# Patient Record
Sex: FEMALE | Race: WHITE | NOT HISPANIC OR LATINO | URBAN - METROPOLITAN AREA
[De-identification: names, ages, dates, MRNs, and addresses within clinical notes are randomized per-mention and may not be internally consistent; named-entity substitution may affect disease eponyms.]

---

## 2019-10-14 ENCOUNTER — OFFICE VISIT (OUTPATIENT)
Dept: CARDIOLOGY | Facility: CLINIC | Age: 69
End: 2019-10-14
Payer: COMMERCIAL

## 2019-10-14 ENCOUNTER — HOSPITAL ENCOUNTER (OUTPATIENT)
Dept: CARDIOLOGY | Facility: CLINIC | Age: 69
Setting detail: NUCLEAR MEDICINE
Discharge: HOME | End: 2019-10-14
Attending: INTERNAL MEDICINE
Payer: COMMERCIAL

## 2019-10-14 VITALS
HEIGHT: 66 IN | WEIGHT: 201 LBS | SYSTOLIC BLOOD PRESSURE: 140 MMHG | DIASTOLIC BLOOD PRESSURE: 82 MMHG | BODY MASS INDEX: 32.3 KG/M2

## 2019-10-14 VITALS
DIASTOLIC BLOOD PRESSURE: 82 MMHG | WEIGHT: 201 LBS | HEIGHT: 66 IN | RESPIRATION RATE: 16 BRPM | BODY MASS INDEX: 32.3 KG/M2 | SYSTOLIC BLOOD PRESSURE: 140 MMHG | HEART RATE: 97 BPM

## 2019-10-14 DIAGNOSIS — R00.1 SINUS BRADYCARDIA: ICD-10-CM

## 2019-10-14 DIAGNOSIS — I10 ESSENTIAL HYPERTENSION: ICD-10-CM

## 2019-10-14 DIAGNOSIS — R06.02 SHORTNESS OF BREATH: Primary | ICD-10-CM

## 2019-10-14 DIAGNOSIS — R06.02 SHORTNESS OF BREATH: ICD-10-CM

## 2019-10-14 PROBLEM — K59.1 FUNCTIONAL DIARRHEA: Status: ACTIVE | Noted: 2019-10-14

## 2019-10-14 PROCEDURE — 93306 TTE W/DOPPLER COMPLETE: CPT | Performed by: INTERNAL MEDICINE

## 2019-10-14 PROCEDURE — 99204 OFFICE O/P NEW MOD 45 MIN: CPT | Performed by: INTERNAL MEDICINE

## 2019-10-14 RX ORDER — NAPROXEN 500 MG/1
500 TABLET ORAL 2 TIMES DAILY WITH MEALS
COMMUNITY
End: 2019-10-14

## 2019-10-14 RX ORDER — CLONIDINE HYDROCHLORIDE 0.3 MG/1
0.3 TABLET ORAL 2 TIMES DAILY
COMMUNITY
End: 2019-10-14 | Stop reason: ALTCHOICE

## 2019-10-14 RX ORDER — VIT C/E/ZN/COPPR/LUTEIN/ZEAXAN 250MG-90MG
1000 CAPSULE ORAL DAILY
COMMUNITY
End: 2019-10-14

## 2019-10-14 RX ORDER — AMLODIPINE BESYLATE 5 MG/1
10 TABLET ORAL
COMMUNITY
Start: 2019-10-14 | End: 2019-10-23 | Stop reason: SDUPTHER

## 2019-10-14 RX ORDER — AMLODIPINE BESYLATE 5 MG/1
5 TABLET ORAL
Refills: 0 | COMMUNITY
Start: 2019-10-04 | End: 2019-10-14

## 2019-10-14 RX ORDER — METOPROLOL SUCCINATE 100 MG/1
100 TABLET, EXTENDED RELEASE ORAL DAILY
COMMUNITY
End: 2020-03-09 | Stop reason: SDUPTHER

## 2019-10-14 SDOH — HEALTH STABILITY: MENTAL HEALTH: HOW OFTEN DO YOU HAVE A DRINK CONTAINING ALCOHOL?: NEVER

## 2019-10-14 ASSESSMENT — ENCOUNTER SYMPTOMS
CHANGE IN BOWEL HABIT: 1
DYSPNEA ON EXERTION: 1
SYNCOPE: 0
DIARRHEA: 1
PALPITATIONS: 0
NEAR-SYNCOPE: 0

## 2019-10-14 NOTE — PROGRESS NOTES
"                   Cardiology Consult/New Patient     Patient ID: Joie Sullivan 68 y.o. female. 1950    History Present Illness     Joie Sullivan presents to the office today for evaluation of increasing fatigue, dyspnea on exertion of bradycardia.  I reviewed your office notes and also obtained history from the patient.  She is a 68-year-old white female whose past medical history is significant for breast cancer, which she says was diagnosed very early and only required excision of a mass and tamoxifen therapy.  She follows with Dr. Zuleika Shah and says she has been doing well.  The patient also has a history of hyperlipidemia and hypertension, stating that she has been on medications since she was in her 40s.  Her medical regimen was a bit unusual, specifically that she used high-dose clonidine 0.3 mg twice a day and Toprol- mg daily.  She says that she was on that medical regimen \"for years\" but recently started having some other issues.    She works at [employer] and there is a very large hill which she has always been able to walk up without a problem.  Recently, she started noticing that it was more difficult for her and she was \"exhausted\" when she did it.  She has also had persistent diarrhea with no clear etiology and is in the process of a work-up for this.  Her heart rate has been slow and there was concern that chronotropic incompetence may have been a factor.  She was just recently changed to amlodipine 5 mg daily and is off Catapres.  She says that she already feels better and her exercise capacity is back to normal.  Her diarrhea is a persistent issue.  There is no change in her GI symptoms off clonidine or with the initiation of amlodipine.    The patient says that she has had hypertension for a number of years, but cannot recall having any particular work-up.  Hypertension runs in her family and she assumed that it was due to this.  She denies any history of coronary " disease, CHF, myocardial infarction, TIA or CVA.  Patient has never been pregnant-she states that she was infertile after treatment for breast cancer and adopted a child.  She denies any history of DVT or pulmonary embolus.  Patient is a non-smoker.  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  She has never had an evaluation for sleep apnea.    Of note, the patient mentioned that she is following up shortly with hematology oncology and there has been discussion about her having oophorectomy.    Past History     Past Medical History:   Diagnosis Date   • Allergic rhinitis    • Benign colonic polyp    • Bradycardia    • Breast cancer (CMS/HCC)     Tamoxifen - no chemo or XRT.  Had small cell cluster   • Diarrhea    • Eczema    • Hematologic abnormality    • Hyperlipidemia    • Hypertension    • Leukocytosis    • Shortness of breath      Past Surgical History:   Procedure Laterality Date   • APPENDECTOMY     • BREAST LUMPECTOMY      Early stage breast carcinoma.   • CHOLECYSTECTOMY       Social History     Tobacco Use   • Smoking status: Never Smoker   • Smokeless tobacco: Never Used   Substance Use Topics   • Alcohol use: Never     Frequency: Never   • Drug use: Defer     Family History   Problem Relation Age of Onset   • Stroke Biological Mother 70        Severe HTN   • Emphysema Biological Father    • Heart disease Biological Father          on wait list for CABG   • Hypertension Biological Brother      Allergies/Medications   Allergies: Patient has no known allergies.    Current Medications:   Outpatient Encounter Medications as of 10/14/2019:   •  amLODIPine (NORVASC) 5 mg tablet, Take 2 tablets (10 mg total) by mouth once daily.  •  metoprolol succinate XL (TOPROL-XL) 100 mg 24 hr tablet, Take 100 mg by mouth daily.  •  [DISCONTINUED] amLODIPine (NORVASC) 5 mg tablet, Take 5 mg by mouth  "once daily.  •  [DISCONTINUED] cholecalciferol, vitamin D3, 1,000 unit capsule, Take 1,000 Units by mouth daily.  •  [DISCONTINUED] cloNIDine (CATAPRES) 0.3 mg tablet, Take 0.3 mg by mouth 2 (two) times a day.  •  [DISCONTINUED] naproxen (NAPROSYN) 500 mg tablet, Take 500 mg by mouth 2 (two) times a day with meals.     Review of Systems   Constitution: Positive for malaise/fatigue.   Cardiovascular: Positive for dyspnea on exertion. Negative for chest pain, leg swelling, near-syncope, palpitations and syncope.   Gastrointestinal: Positive for change in bowel habit and diarrhea.     Vitals:    10/14/19 1033   BP: 140/82   BP Location: Left upper arm   Patient Position: Sitting   Pulse: 97   Resp: 16   Weight: 91.2 kg (201 lb)   Height: 1.676 m (5' 6\")     Physical Exam   Constitutional: She is oriented to person, place, and time. She appears well-developed and well-nourished. She is cooperative.   Overweight white female in no apparent distress   HENT:   Head: Normocephalic and atraumatic.   Mouth/Throat: Oropharynx is clear and moist and mucous membranes are normal.   Eyes: Conjunctivae and EOM are normal. No scleral icterus.   Neck: Normal range of motion. Neck supple. No hepatojugular reflux and no JVD present. Carotid bruit is not present. No thyromegaly present.   Cardiovascular: Normal rate, regular rhythm, S1 normal, S2 normal and intact distal pulses.  No extrasystoles are present. PMI is not displaced. Exam reveals gallop and S4. Exam reveals no S3 and no friction rub.   Murmur heard.  Pulses:       Carotid pulses are 2+ on the right side, and 2+ on the left side.       Posterior tibial pulses are 2+ on the right side, and 2+ on the left side.   There is a 1/6 systolic murmur at the right and left sternal borders.   Pulmonary/Chest: Effort normal and breath sounds normal. She has no wheezes. She has no rhonchi. She has no rales.   Abdominal: Soft. Bowel sounds are normal. She exhibits no abdominal bruit and " no pulsatile midline mass. There is no tenderness.   Musculoskeletal: Normal range of motion. She exhibits no tenderness or deformity.   Neurological: She is alert and oriented to person, place, and time. No cranial nerve deficit.   Skin: Skin is warm, dry and intact. No rash noted. No cyanosis. Nails show no clubbing.   Psychiatric: She has a normal mood and affect. Her speech is normal and behavior is normal.     Labs/ Imaging/Procedures   Labs  10/4/2019: WBC 8.7, hemoglobin 12.7, platelets 291,000, glucose 161, BUN 11, creatinine 0.9, LFTs normal.  Total chol 143, trigs 79, HDL 48, LDL 79 mg/dL.  Hemoglobin A1c 6.7.  TSH 1.9.  ESR 36.    Imaging  Echo 10/14/2019: TDS. Small LV cavity with mild concentric LVH.  LVEF 70-75%.  Abnormal septal motion but otherwise normal wall motion.  No LVOT gradient.  Impaired relaxation.  Normal RV size and function.  Trace TR.  No pulmonary hypertension by Doppler.  Trace MR.  Prominent pericardial fat pad.    EKG: Sinus bradycardia at 49 bpm.  Otherwise unremarkable.  Tracing performed 10/4/2019    Assessment/Plan     1. Shortness of breath  Patient was experiencing unusual dyspnea on exertion, which could have been secondary to chronotropic incompetence.  It is a bit unusual, since she had been on medication for some time and then developed the symptoms only recently.  Her echocardiogram shows overall preserved LV function with a small LV cavity and I think she would benefit from continued beta-blocker.  She has no evidence of volume overload on examination and if anything, I would prefer to avoid diuretics given her persistent diarrhea.  The other likely etiology for her dyspnea is hypertensive heart disease.  I am increasing her amlodipine to 10 mg daily since her blood pressure is suboptimally controlled.  When her blood pressure is adequately controlled, we will address stress testing, which is necessary given her recent onset of dyspnea.    2. Essential hypertension  Joie  developed hypertension at a young age, but has a strong family history.  She needs to lose weight and I think a sleep study could also be helpful for her.  If her blood pressure proves difficult to control, we will need to pursue a secondary work-up.  For now I am continuing the amlodipine because she does not seem to have any adverse GI effects.  Continue Toprol- mg daily.  Her heart rate in the office today varied between 60 and 85 bpm.  Low-salt diet.    3. Sinus bradycardia  Sinus bradycardia on EKG a few weeks ago, but now with a heart rate at rest around 60 bpm.  With interaction movement in the office her heart rate increased to approximately 85-90 bpm.  She has a forceful precordial pulse and S4-as well as a small LV cavity-so she needs to continue beta-blocker.    4.  Persistent diarrhea  The patient has no cardiac contraindication to any necessary GI work-up, including endoscopy or colonoscopy.    Return in about 1 month (around 11/14/2019) for next scheduled follow-up, earlier with any change in symptoms.  Thank you for allowing me to participate in the care of this patient.  I hope this information is helpful.  Please do not hesitate to contact me with any questions or concerns.      Amy Montero MD [facility]  10/15/2019

## 2019-10-14 NOTE — LETTER
"October 15, 2019     Cris Quiroz MD  [address] [facility] [address]  [facility] [facility] [address]    Patient: Joie Sullivan  YOB: 1950  Date of Visit: 10/14/2019      Dear Dr. Quiroz:    Thank you for referring Joie Sullivan to me for evaluation. Below are my notes for this consultation.    If you have questions, please do not hesitate to call me. I look forward to following your patient along with you.         Sincerely,        Amy Montero MD        CC: No Recipients  Amy Ken MD  10/15/2019 12:04 PM  Signed                     Cardiology Consult/New Patient     Patient ID: Joie Sullivan 68 y.o. female. 1950    History Present Illness     Joie Sullivan presents to the office today for evaluation of increasing fatigue, dyspnea on exertion of bradycardia.  I reviewed your office notes and also obtained history from the patient.  She is a 68-year-old white female whose past medical history is significant for breast cancer, which she says was diagnosed very early and only required excision of a mass and tamoxifen therapy.  She follows with Dr. Zuleika Shah and says she has been doing well.  The patient also has a history of hyperlipidemia and hypertension, stating that she has been on medications since she was in her 40s.  Her medical regimen was a bit unusual, specifically that she used high-dose clonidine 0.3 mg twice a day and Toprol- mg daily.  She says that she was on that medical regimen \"for years\" but recently started having some other issues.    She works at [employer] and there is a very large hill which she has always been able to walk up without a problem.  Recently, she started noticing that it was more difficult for her and she was \"exhausted\" when she did it.  She has also had persistent diarrhea with no clear etiology and is in the process of a work-up for this.  Her heart rate has been slow and there was concern that chronotropic incompetence " may have been a factor.  She was just recently changed to amlodipine 5 mg daily and is off Catapres.  She says that she already feels better and her exercise capacity is back to normal.  Her diarrhea is a persistent issue.  There is no change in her GI symptoms off clonidine or with the initiation of amlodipine.    The patient says that she has had hypertension for a number of years, but cannot recall having any particular work-up.  Hypertension runs in her family and she assumed that it was due to this.  She denies any history of coronary disease, CHF, myocardial infarction, TIA or CVA.  Patient has never been pregnant-she states that she was infertile after treatment for breast cancer and adopted a child.  She denies any history of DVT or pulmonary embolus.  Patient is a non-smoker.  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  She has never had an evaluation for sleep apnea.    Of note, the patient mentioned that she is following up shortly with hematology oncology and there has been discussion about her having oophorectomy.    Past History     Past Medical History:   Diagnosis Date   • Allergic rhinitis    • Benign colonic polyp    • Bradycardia    • Breast cancer (CMS/HCC) 1991    Tamoxifen - no chemo or XRT.  Had small cell cluster   • Diarrhea    • Eczema    • Hematologic abnormality    • Hyperlipidemia    • Hypertension    • Leukocytosis    • Shortness of breath      Past Surgical History:   Procedure Laterality Date   • APPENDECTOMY     • BREAST LUMPECTOMY      Early stage breast carcinoma.   • CHOLECYSTECTOMY       Social History     Tobacco Use   • Smoking status: Never Smoker   • Smokeless tobacco: Never Used   Substance Use Topics   • Alcohol use: Never     Frequency: Never   • Drug use: Defer     Family History   Problem Relation Age of Onset   • Stroke Biological Mother 70         "Severe HTN   • Emphysema Biological Father    • Heart disease Biological Father          on wait list for CABG   • Hypertension Biological Brother      Allergies/Medications   Allergies: Patient has no known allergies.    Current Medications:   Outpatient Encounter Medications as of 10/14/2019:   •  amLODIPine (NORVASC) 5 mg tablet, Take 2 tablets (10 mg total) by mouth once daily.  •  metoprolol succinate XL (TOPROL-XL) 100 mg 24 hr tablet, Take 100 mg by mouth daily.  •  [DISCONTINUED] amLODIPine (NORVASC) 5 mg tablet, Take 5 mg by mouth once daily.  •  [DISCONTINUED] cholecalciferol, vitamin D3, 1,000 unit capsule, Take 1,000 Units by mouth daily.  •  [DISCONTINUED] cloNIDine (CATAPRES) 0.3 mg tablet, Take 0.3 mg by mouth 2 (two) times a day.  •  [DISCONTINUED] naproxen (NAPROSYN) 500 mg tablet, Take 500 mg by mouth 2 (two) times a day with meals.     Review of Systems   Constitution: Positive for malaise/fatigue.   Cardiovascular: Positive for dyspnea on exertion. Negative for chest pain, leg swelling, near-syncope, palpitations and syncope.   Gastrointestinal: Positive for change in bowel habit and diarrhea.     Vitals:    10/14/19 1033   BP: 140/82   BP Location: Left upper arm   Patient Position: Sitting   Pulse: 97   Resp: 16   Weight: 91.2 kg (201 lb)   Height: 1.676 m (5' 6\")     Physical Exam   Constitutional: She is oriented to person, place, and time. She appears well-developed and well-nourished. She is cooperative.   Overweight white female in no apparent distress   HENT:   Head: Normocephalic and atraumatic.   Mouth/Throat: Oropharynx is clear and moist and mucous membranes are normal.   Eyes: Conjunctivae and EOM are normal. No scleral icterus.   Neck: Normal range of motion. Neck supple. No hepatojugular reflux and no JVD present. Carotid bruit is not present. No thyromegaly present.   Cardiovascular: Normal rate, regular rhythm, S1 normal, S2 normal and intact distal pulses.  No extrasystoles " are present. PMI is not displaced. Exam reveals gallop and S4. Exam reveals no S3 and no friction rub.   Murmur heard.  Pulses:       Carotid pulses are 2+ on the right side, and 2+ on the left side.       Posterior tibial pulses are 2+ on the right side, and 2+ on the left side.   There is a 1/6 systolic murmur at the right and left sternal borders.   Pulmonary/Chest: Effort normal and breath sounds normal. She has no wheezes. She has no rhonchi. She has no rales.   Abdominal: Soft. Bowel sounds are normal. She exhibits no abdominal bruit and no pulsatile midline mass. There is no tenderness.   Musculoskeletal: Normal range of motion. She exhibits no tenderness or deformity.   Neurological: She is alert and oriented to person, place, and time. No cranial nerve deficit.   Skin: Skin is warm, dry and intact. No rash noted. No cyanosis. Nails show no clubbing.   Psychiatric: She has a normal mood and affect. Her speech is normal and behavior is normal.     Labs/ Imaging/Procedures   Labs  10/4/2019: WBC 8.7, hemoglobin 12.7, platelets 291,000, glucose 161, BUN 11, creatinine 0.9, LFTs normal.  Total chol 143, trigs 79, HDL 48, LDL 79 mg/dL.  Hemoglobin A1c 6.7.  TSH 1.9.  ESR 36.    Imaging  Echo 10/14/2019: TDS. Small LV cavity with mild concentric LVH.  LVEF 70-75%.  Abnormal septal motion but otherwise normal wall motion.  No LVOT gradient.  Impaired relaxation.  Normal RV size and function.  Trace TR.  No pulmonary hypertension by Doppler.  Trace MR.  Prominent pericardial fat pad.    EKG: Sinus bradycardia at 49 bpm.  Otherwise unremarkable.  Tracing performed 10/4/2019    Assessment/Plan     1. Shortness of breath  Patient was experiencing unusual dyspnea on exertion, which could have been secondary to chronotropic incompetence.  It is a bit unusual, since she had been on medication for some time and then developed the symptoms only recently.  Her echocardiogram shows overall preserved LV function with a small  LV cavity and I think she would benefit from continued beta-blocker.  She has no evidence of volume overload on examination and if anything, I would prefer to avoid diuretics given her persistent diarrhea.  The other likely etiology for her dyspnea is hypertensive heart disease.  I am increasing her amlodipine to 10 mg daily since her blood pressure is suboptimally controlled.  When her blood pressure is adequately controlled, we will address stress testing, which is necessary given her recent onset of dyspnea.    2. Essential hypertension  Joie developed hypertension at a young age, but has a strong family history.  She needs to lose weight and I think a sleep study could also be helpful for her.  If her blood pressure proves difficult to control, we will need to pursue a secondary work-up.  For now I am continuing the amlodipine because she does not seem to have any adverse GI effects.  Continue Toprol- mg daily.  Her heart rate in the office today varied between 60 and 85 bpm.  Low-salt diet.    3. Sinus bradycardia  Sinus bradycardia on EKG a few weeks ago, but now with a heart rate at rest around 60 bpm.  With interaction movement in the office her heart rate increased to approximately 85-90 bpm.  She has a forceful precordial pulse and S4-as well as a small LV cavity-so she needs to continue beta-blocker.    4.  Persistent diarrhea  The patient has no cardiac contraindication to any necessary GI work-up, including endoscopy or colonoscopy.    Return in about 1 month (around 11/14/2019) for next scheduled follow-up, earlier with any change in symptoms.  Thank you for allowing me to participate in the care of this patient.  I hope this information is helpful.  Please do not hesitate to contact me with any questions or concerns.      Amy Montero MD [facility]  10/15/2019

## 2019-10-15 LAB
AORTIC ROOT ANNULUS - M-MODE: 2.8 CM
AV PEAK GRADIENT: 9 MMHG
AV PEAK VELOCITY-S: 1.47 M/S
AV VALVE AREA: 2.37 CM2
BSA FOR ECHO PROCEDURE: 2.06 M2
CUSP SEPARATION: 2.1 CM
DOP CALC LVOT STROKE VOLUME: 63.08 ML
E WAVE DECELERATION TIME: 271 MS
E/A RATIO: 0.9
E/E' RATIO: 13
E/LAT E' RATIO: 10.9
ECHO EF ESTIMATED: 68.8 %
EDV (BP): 61.7 CM3
EF (A4C): 70.5 %
EF A2C: 56.6 %
EJECTION FRACTION: 63.9 %
ESV (BP): 22.3 CM3
FRACTIONAL SHORTENING: 37.7 %
INTERVENTRICULAR SEPTUM: 0.9 CM
LA ESV (BP): 56.7 CM3
LA ESV INDEX (A2C): 28.25 CM3/M2
LA ESV INDEX (BP): 27.52 CM3/M2
LA/AORTA RATIO: 1.61
LAAS-AP2: 18.8 CM2
LAAS-AP4: 18.4 CM2
LAD 2D - M-MODE: 4.5 CM
LAD 2D - M-MODE: 4.5 CM
LAD 2D: 4.3 CM
LALD A4C: 4.97 CM
LALD A4C: 5.51 CM
LAV-S: 58.2 CM3
LEFT ATRIUM VOLUME INDEX: 24.32 CM3/M2
LEFT ATRIUM VOLUME: 50.1 CM3
LEFT INTERNAL DIMENSION IN SYSTOLE: 2.26 CM (ref 2.92–4.42)
LEFT VENTRICLE DIASTOLIC VOLUME INDEX: 34.51 CM3/M2
LEFT VENTRICLE DIASTOLIC VOLUME: 71.1 CM3
LEFT VENTRICLE SYSTOLIC VOLUME INDEX: 10.19 CM3/M2
LEFT VENTRICLE SYSTOLIC VOLUME: 21 CM3
LEFT VENTRICULAR INTERNAL DIMENSION IN DIASTOLE: 3.63 CM (ref 4.97–6.9)
LEFT VENTRICULAR POSTERIOR WALL IN END DIASTOLE: 1.01 CM (ref 0.64–1.2)
LV DIASTOLIC VOLUME: 52.1 CM3
LV ESV (APICAL 2 CHAMBER): 22.5 CM3
LVAD-AP2: 22.5 CM2
LVAD-AP4: 26.9 CM2
LVAS-AP2: 13.3 CM2
LVAS-AP4: 13.2 CM2
LVEDVI(A2C): 25.29 CM3/M2
LVEDVI(BP): 29.95 CM3/M2
LVESVI(A2C): 10.92 CM3/M2
LVESVI(BP): 10.83 CM3/M2
LVLD-AP2: 7.83 CM
LVLD-AP4: 8.14 CM
LVLS-AP2: 6.41 CM
LVLS-AP4: 6.8 CM
LVOT 2D: 1.8 CM
LVOT A: 2.54 CM2
LVOT MG: 3 MMHG
LVOT MV: 0.8 M/S
LVOT PEAK VELOCITY: 1.37 M/S
LVOT VTI: 24.8 CM
MV E'TISSUE VEL-LAT: 0.06 M/S
MV E'TISSUE VEL-MED: 0.05 M/S
MV PEAK A VEL: 0.76 M/S
MV PEAK E VEL: 0.67 M/S
POSTERIOR WALL: 1.01 CM
PV PEAK GRADIENT: 5 MMHG
PV PV: 1.11 M/S
RVOT VMAX: 0.96 M/S
RVOT VTI: 19.3 CM
SEPTAL TISSUE DOPPLER FREE WALL LATE DIA VELOCITY (APICAL 4 CHAMBER VIEW): 0.14 M/S
Z-SCORE OF LEFT VENTRICULAR DIMENSION IN END DIASTOLE: -4.78
Z-SCORE OF LEFT VENTRICULAR DIMENSION IN END SYSTOLE: -3.67
Z-SCORE OF LEFT VENTRICULAR POSTERIOR WALL IN END DIASTOLE: 0.74

## 2019-10-23 RX ORDER — AMLODIPINE BESYLATE 5 MG/1
10 TABLET ORAL
Qty: 180 TABLET | Refills: 1 | Status: SHIPPED | OUTPATIENT
Start: 2019-10-23 | End: 2020-01-17 | Stop reason: SDUPTHER

## 2019-10-23 RX ORDER — AMLODIPINE BESYLATE 5 MG/1
10 TABLET ORAL
Status: CANCELLED | OUTPATIENT
Start: 2019-10-23

## 2019-10-23 NOTE — TELEPHONE ENCOUNTER
Medicine Refill Request    Last Office Visit: 10/14/2019  Next Office Visit: 11/4/2019    Amlodipine 5mg. Pt needs pharmacy to be called to confirm her instructions has been changed to 5mg BID per Dr Kamara. Pt will be out of meds by tomorrow. [facility] [phone].    Current Outpatient Medications:   •  amLODIPine (NORVASC) 5 mg tablet, Take 2 tablets (10 mg total) by mouth once daily., Disp: , Rfl:   •  metoprolol succinate XL (TOPROL-XL) 100 mg 24 hr tablet, Take 100 mg by mouth daily., Disp: , Rfl:       BP Readings from Last 3 Encounters:   10/14/19 140/82   10/14/19 140/82       Recent Lab results:  No results found for: CHOL, No results found for: HDL, No results found for: LDLCALC, No results found for: TRIG     No results found for: GLUCOSE, No results found for: HGBA1C      No results found for: CREATININE    No results found for: TSH

## 2019-11-04 ENCOUNTER — OFFICE VISIT (OUTPATIENT)
Dept: CARDIOLOGY | Facility: CLINIC | Age: 69
End: 2019-11-04
Payer: COMMERCIAL

## 2019-11-04 VITALS
SYSTOLIC BLOOD PRESSURE: 148 MMHG | HEART RATE: 70 BPM | DIASTOLIC BLOOD PRESSURE: 80 MMHG | HEIGHT: 66 IN | RESPIRATION RATE: 18 BRPM | WEIGHT: 201.8 LBS | BODY MASS INDEX: 32.43 KG/M2

## 2019-11-04 DIAGNOSIS — R06.02 SHORTNESS OF BREATH: ICD-10-CM

## 2019-11-04 DIAGNOSIS — I10 ESSENTIAL HYPERTENSION: Primary | ICD-10-CM

## 2019-11-04 DIAGNOSIS — R00.1 SINUS BRADYCARDIA: ICD-10-CM

## 2019-11-04 PROCEDURE — 93000 ELECTROCARDIOGRAM COMPLETE: CPT | Performed by: INTERNAL MEDICINE

## 2019-11-04 PROCEDURE — 99214 OFFICE O/P EST MOD 30 MIN: CPT | Performed by: INTERNAL MEDICINE

## 2019-11-04 RX ORDER — LOSARTAN POTASSIUM 50 MG/1
50 TABLET ORAL DAILY
Qty: 30 TABLET | Refills: 6 | Status: SHIPPED | OUTPATIENT
Start: 2019-11-04 | End: 2020-01-17 | Stop reason: SDUPTHER

## 2019-11-04 NOTE — PATIENT INSTRUCTIONS
Please return to the office in 4 weeks to follow-up on your BP with one of our nurses (Trudi or Marti).  If your BP has improved with addition of Losartan, we will get your stress test scheduled.

## 2019-11-04 NOTE — PROGRESS NOTES
Cardiology Office Visit     Patient ID: Joie Sullivan 68 y.o. female 1950  PCP: Cris Quiroz MD   History Present Illness     Joie Sullivan returns to the office today for follow-up of her dyspnea on exertion in the setting of bradycardia.  As you recall, she was having increasing dyspnea walking around campus, especially when she needed to go up a long hill.  She was on clonidine at the time and was feeling quite exhausted.  She also was experiencing persistent diarrhea, for which she is still undergoing work-up.  She denies any chest pain and after discontinuing clonidine, she already felt better.  When I last saw her, we increased her amlodipine to 10 mg daily and continued her beta-blocker.    Echocardiography performed at that time was a technically difficult study but showed a small LV cavity with mild concentric LVH.  LVEF was 70 to 75% with abnormal septal motion.  No LVOT gradient.  Impaired relaxation.  Trace MR and TR.  There is no significant pericardial effusion.  These results were reviewed with patient today.    Past History     Past Medical History:   Diagnosis Date   • Allergic rhinitis    • Benign colonic polyp    • Bradycardia    • Breast cancer (CMS/HCC) 1991    Tamoxifen - no chemo or XRT.  Had small cell cluster   • Diarrhea    • Eczema    • Hematologic abnormality    • Hyperlipidemia    • Hypertension    • Leukocytosis    • Shortness of breath      Past Surgical History:   Procedure Laterality Date   • APPENDECTOMY     • BREAST LUMPECTOMY      Early stage breast carcinoma.   • CHOLECYSTECTOMY       Social History:  reports that she has never smoked. She has never used smokeless tobacco. Drug use questions deferred to the physician. She reports that she does not drink alcohol.    Family History: family history includes Emphysema in her biological father; Heart disease in her biological father; Hypertension in her biological brother; Stroke (age of onset: 70) in her  "biological mother.  Allergies/Medications   Allergies:Patient has no known allergies.    Medications:  Outpatient Encounter Medications as of 11/4/2019:   •  amLODIPine (NORVASC) 5 mg tablet, Take 2 tablets (10 mg total) by mouth once daily.  •  metoprolol succinate XL (TOPROL-XL) 100 mg 24 hr tablet, Take 100 mg by mouth daily.  •  losartan (COZAAR) 50 mg tablet, Take 1 tablet (50 mg total) by mouth daily.  ROS/Physical Exam   ROS  Pertinent items are noted in HPI.  Comprehensive (12+ point) ROS otherwise negative.  Vitals:    11/04/19 1010   BP: (!) 148/80 millimeters mercury.  Repeat blood pressure 148/84 mmHg   BP Location: Left upper arm   Patient Position: Sitting   Pulse: 70   Resp: 18   Weight: 91.5 kg (201 lb 12.8 oz)   Height: 1.676 m (5' 6\")     BP Readings from Last 3 Encounters:   11/04/19 (!) 148/80   10/14/19 140/82   10/14/19 140/82     Wt Readings from Last 3 Encounters:   11/04/19 91.5 kg (201 lb 12.8 oz)   10/14/19 91.2 kg (201 lb)   10/14/19 91.2 kg (201 lb)     Physical Exam   Constitutional: She is oriented to person, place, and time. She appears well-developed and well-nourished. She is cooperative.   Overweight white female in no apparent distress   HENT:   Head: Normocephalic and atraumatic.   Mouth/Throat: Oropharynx is clear and moist and mucous membranes are normal.   Eyes: Conjunctivae and EOM are normal. No scleral icterus.   Neck: Normal range of motion. Neck supple. No hepatojugular reflux and no JVD present. Carotid bruit is not present. No thyromegaly present.   Cardiovascular: Normal rate, regular rhythm, S1 normal, S2 normal and intact distal pulses.  No extrasystoles are present. PMI is not displaced. Exam reveals gallop and S4. Exam reveals no S3 and no friction rub.   Murmur heard.  Pulses:       Carotid pulses are 2+ on the right side, and 2+ on the left side.       Posterior tibial pulses are 2+ on the right side, and 2+ on the left side.   There is a 1/6 systolic murmur at " the right and left sternal borders.   Pulmonary/Chest: Effort normal and breath sounds normal. She has no wheezes. She has no rhonchi. She has no rales.   Abdominal: Soft. Bowel sounds are normal. She exhibits no abdominal bruit and no pulsatile midline mass. There is no tenderness.   Musculoskeletal: Normal range of motion. She exhibits no tenderness or deformity.   Neurological: She is alert and oriented to person, place, and time. No cranial nerve deficit.   Skin: Skin is warm, dry and intact. No rash noted. No cyanosis. Nails show no clubbing.   Psychiatric: She has a normal mood and affect. Her speech is normal and behavior is normal.     Labs/Imaging/Procedures   Labs  10/4/2019: WBC 8.7, hgb 12.7, platelets 291,000, glucose 161, BUN 11, creat 0.9, LFTs normal.  Total cholesterol 143, trigs 79, HDL 40, LDL 79 mg/dL.  Hemoglobin A1c 6.7.  TSH 1.9.  ESR 36    Imaging  Echo 10/14/2019: TDS.  Small LV cavity.  Mild concentric LVH.  LVEF 70-75%.  Abnormal septal motion but otherwise normal wall LVOT gradient.  Impaired relaxation.  Normal RV size and function.  Trace TR.  No pulmonary hypertension by Doppler.  Trace MR.  Prominent pericardial fat pad.    EKG  Assessment/Plan     1. Essential hypertension  Blood pressure is still too high and I am adding losartan 50 mg once daily.  She should continue amlodipine 10 mg daily and Toprol- mg daily.  The patient still has a large supply of 5 mg amlodipine tablets which she would like to use prior to getting a new prescription for 10 mg pills.    2. Sinus bradycardia  Heart rate today is 70, likely due to the withdrawal of clonidine.  Adding amlodipine.  Continue Toprol- mg daily.    3. Shortness of breath  Her shortness of breath has improved significantly after stopping clonidine and I think she may have had an element of chronotropic incompetence that was drug-induced.  Her LV function was quite vigorous on her echo, but she had no LVOT gradient.  Continue  Toprol- mg daily.  She is awaiting the results of her GI work-up.  Continue amlodipine 10 mg daily and add losartan for better blood pressure control.  No evidence of congestive heart failure.  If anything, I think she is tending toward the drier side and for that reason we are avoiding diuretics.    Return in about 6 months (around 5/4/2020).  The patient will return to the office in approximately 4 weeks for blood pressure follow-up.  If she has improved, we will move forward with plans for stress testing.  I have reviewed the patient's medical history in detail and updated the computerized patient record.  Thank you for allowing me to participate in the care of this patient.  I hope this information is helpful.    Amy Montero MD [facility]  11/4/2019  5:30 PM  This document was generated utilizing voice recognition technology. Please excuse any typographical errors which may be present.

## 2019-11-04 NOTE — LETTER
November 4, 2019     Cris Quiroz MD  [address] [address]    Patient: Joie Sullivan  YOB: 1950  Date of Visit: 11/4/2019      Dear Dr. Quiroz:    Thank you for referring Joie Sullivan to me for evaluation. Below are my notes for this consultation.    If you have questions, please do not hesitate to call me. I look forward to following your patient along with you.         Sincerely,        Amy Montero MD        CC: No Recipients  Amy Ken MD  11/4/2019  5:36 PM  Signed       Cardiology Office Visit     Patient ID: Joie Sullivan 68 y.o. female 1950  PCP: Cris Quiroz MD   History Present Illness     Joie Sullivan returns to the office today for follow-up of her dyspnea on exertion in the setting of bradycardia.  As you recall, she was having increasing dyspnea walking around campus, especially when she needed to go up a long hill.  She was on clonidine at the time and was feeling quite exhausted.  She also was experiencing persistent diarrhea, for which she is still undergoing work-up.  She denies any chest pain and after discontinuing clonidine, she already felt better.  When I last saw her, we increased her amlodipine to 10 mg daily and continued her beta-blocker.    Echocardiography performed at that time was a technically difficult study but showed a small LV cavity with mild concentric LVH.  LVEF was 70 to 75% with abnormal septal motion.  No LVOT gradient.  Impaired relaxation.  Trace MR and TR.  There is no significant pericardial effusion.  These results were reviewed with patient today.    Past History     Past Medical History:   Diagnosis Date   • Allergic rhinitis    • Benign colonic polyp    • Bradycardia    • Breast cancer (CMS/HCC) 1991    Tamoxifen - no chemo or XRT.  Had small cell cluster   • Diarrhea    • Eczema    • Hematologic abnormality    • Hyperlipidemia    • Hypertension    • Leukocytosis    • Shortness of  "breath      Past Surgical History:   Procedure Laterality Date   • APPENDECTOMY     • BREAST LUMPECTOMY      Early stage breast carcinoma.   • CHOLECYSTECTOMY       Social History:  reports that she has never smoked. She has never used smokeless tobacco. Drug use questions deferred to the physician. She reports that she does not drink alcohol.    Family History: family history includes Emphysema in her biological father; Heart disease in her biological father; Hypertension in her biological brother; Stroke (age of onset: 70) in her biological mother.  Allergies/Medications   Allergies:Patient has no known allergies.    Medications:  Outpatient Encounter Medications as of 11/4/2019:   •  amLODIPine (NORVASC) 5 mg tablet, Take 2 tablets (10 mg total) by mouth once daily.  •  metoprolol succinate XL (TOPROL-XL) 100 mg 24 hr tablet, Take 100 mg by mouth daily.  •  losartan (COZAAR) 50 mg tablet, Take 1 tablet (50 mg total) by mouth daily.  ROS/Physical Exam   ROS  Pertinent items are noted in HPI.  Comprehensive (12+ point) ROS otherwise negative.  Vitals:    11/04/19 1010   BP: (!) 148/80 millimeters mercury.  Repeat blood pressure 148/84 mmHg   BP Location: Left upper arm   Patient Position: Sitting   Pulse: 70   Resp: 18   Weight: 91.5 kg (201 lb 12.8 oz)   Height: 1.676 m (5' 6\")     BP Readings from Last 3 Encounters:   11/04/19 (!) 148/80   10/14/19 140/82   10/14/19 140/82     Wt Readings from Last 3 Encounters:   11/04/19 91.5 kg (201 lb 12.8 oz)   10/14/19 91.2 kg (201 lb)   10/14/19 91.2 kg (201 lb)     Physical Exam   Constitutional: She is oriented to person, place, and time. She appears well-developed and well-nourished. She is cooperative.   Overweight white female in no apparent distress   HENT:   Head: Normocephalic and atraumatic.   Mouth/Throat: Oropharynx is clear and moist and mucous membranes are normal.   Eyes: Conjunctivae and EOM are normal. No scleral icterus.   Neck: Normal range of motion. " Neck supple. No hepatojugular reflux and no JVD present. Carotid bruit is not present. No thyromegaly present.   Cardiovascular: Normal rate, regular rhythm, S1 normal, S2 normal and intact distal pulses.  No extrasystoles are present. PMI is not displaced. Exam reveals gallop and S4. Exam reveals no S3 and no friction rub.   Murmur heard.  Pulses:       Carotid pulses are 2+ on the right side, and 2+ on the left side.       Posterior tibial pulses are 2+ on the right side, and 2+ on the left side.   There is a 1/6 systolic murmur at the right and left sternal borders.   Pulmonary/Chest: Effort normal and breath sounds normal. She has no wheezes. She has no rhonchi. She has no rales.   Abdominal: Soft. Bowel sounds are normal. She exhibits no abdominal bruit and no pulsatile midline mass. There is no tenderness.   Musculoskeletal: Normal range of motion. She exhibits no tenderness or deformity.   Neurological: She is alert and oriented to person, place, and time. No cranial nerve deficit.   Skin: Skin is warm, dry and intact. No rash noted. No cyanosis. Nails show no clubbing.   Psychiatric: She has a normal mood and affect. Her speech is normal and behavior is normal.     Labs/Imaging/Procedures   Labs  10/4/2019: WBC 8.7, hgb 12.7, platelets 291,000, glucose 161, BUN 11, creat 0.9, LFTs normal.  Total cholesterol 143, trigs 79, HDL 40, LDL 79 mg/dL.  Hemoglobin A1c 6.7.  TSH 1.9.  ESR 36    Imaging  Echo 10/14/2019: TDS.  Small LV cavity.  Mild concentric LVH.  LVEF 70-75%.  Abnormal septal motion but otherwise normal wall LVOT gradient.  Impaired relaxation.  Normal RV size and function.  Trace TR.  No pulmonary hypertension by Doppler.  Trace MR.  Prominent pericardial fat pad.    EKG  Assessment/Plan     1. Essential hypertension  Blood pressure is still too high and I am adding losartan 50 mg once daily.  She should continue amlodipine 10 mg daily and Toprol- mg daily.  The patient still has a large  supply of 5 mg amlodipine tablets which she would like to use prior to getting a new prescription for 10 mg pills.    2. Sinus bradycardia  Heart rate today is 70, likely due to the withdrawal of clonidine.  Adding amlodipine.  Continue Toprol- mg daily.    3. Shortness of breath  Her shortness of breath has improved significantly after stopping clonidine and I think she may have had an element of chronotropic incompetence that was drug-induced.  Her LV function was quite vigorous on her echo, but she had no LVOT gradient.  Continue Toprol- mg daily.  She is awaiting the results of her GI work-up.  Continue amlodipine 10 mg daily and add losartan for better blood pressure control.  No evidence of congestive heart failure.  If anything, I think she is tending toward the drier side and for that reason we are avoiding diuretics.    Return in about 6 months (around 5/4/2020).  The patient will return to the office in approximately 4 weeks for blood pressure follow-up.  If she has improved, we will move forward with plans for stress testing.  I have reviewed the patient's medical history in detail and updated the computerized patient record.  Thank you for allowing me to participate in the care of this patient.  I hope this information is helpful.    Amy Montero MD [facility]  11/4/2019  5:30 PM  This document was generated utilizing voice recognition technology. Please excuse any typographical errors which may be present.

## 2019-12-02 ENCOUNTER — DOCUMENTATION (OUTPATIENT)
Dept: CARDIOLOGY | Facility: CLINIC | Age: 69
End: 2019-12-02

## 2019-12-02 NOTE — PROGRESS NOTES
Pt came in this morning for bp check at 9:40am, Dr Tobar had increased her Amlodipine to 10mg and added Losartan 50mg on 11/4/19. She states she saw pcp Dr Quiroz on 11/18/19 and she had increased her Losartan to 100mg. BP today is 170/72 HR 70, 15 mins later BP was checked by Trudi TORIBIO still elevated at 160/64 RA, 158/62 LA. Per Dr Tobar for pt to stay on same dosage for now and come back in 7-10 days for another bp check. Pt is scheduled for Wed 12/11/19 @ 8:30AM.

## 2019-12-11 ENCOUNTER — DOCUMENTATION (OUTPATIENT)
Dept: CARDIOLOGY | Facility: CLINIC | Age: 69
End: 2019-12-11

## 2019-12-11 NOTE — PROGRESS NOTES
Joie Man came in today for blood pressure check today at 8:20 AM her BP was 144/82 in left arm at rest, HR 63. Joie continues on Losartan 100 mg daily, Amlodipine 10 mg daily and Metoprolol succinate  mg daily. Denies headaches and blurred vision. I told her I would inform you of her BP check visit and if any changes would like to be made we will contact her, Joie Man verbalized full understanding. Thank you !

## 2020-01-17 DIAGNOSIS — I10 ESSENTIAL HYPERTENSION: ICD-10-CM

## 2020-01-17 RX ORDER — AMLODIPINE BESYLATE 5 MG/1
10 TABLET ORAL
Qty: 180 TABLET | Refills: 3 | Status: SHIPPED | OUTPATIENT
Start: 2020-01-17 | End: 2020-01-31 | Stop reason: SDUPTHER

## 2020-01-17 RX ORDER — LOSARTAN POTASSIUM 50 MG/1
50 TABLET ORAL DAILY
Qty: 90 TABLET | Refills: 3 | Status: SHIPPED | OUTPATIENT
Start: 2020-01-17 | End: 2020-01-31 | Stop reason: SDUPTHER

## 2020-01-17 NOTE — TELEPHONE ENCOUNTER
Medicine Refill Request    Last Office Visit: Visit date not found  Next Office Visit: Visit date not found    Pt requesting refill for amlodipine and losartan.         Current Outpatient Medications:   •  amLODIPine (NORVASC) 5 mg tablet, Take 2 tablets (10 mg total) by mouth once daily., Disp: 180 tablet, Rfl: 1  •  losartan (COZAAR) 50 mg tablet, Take 1 tablet (50 mg total) by mouth daily., Disp: 30 tablet, Rfl: 6  •  metoprolol succinate XL (TOPROL-XL) 100 mg 24 hr tablet, Take 100 mg by mouth daily., Disp: , Rfl:       BP Readings from Last 3 Encounters:   11/04/19 (!) 148/80   10/14/19 140/82   10/14/19 140/82       Recent Lab results:  No results found for: CHOL, No results found for: HDL, No results found for: LDLCALC, No results found for: TRIG     No results found for: GLUCOSE, No results found for: HGBA1C      No results found for: CREATININE    No results found for: TSH

## 2020-01-31 ENCOUNTER — TELEPHONE (OUTPATIENT)
Dept: SCHEDULING | Facility: CLINIC | Age: 70
End: 2020-01-31

## 2020-01-31 DIAGNOSIS — I10 ESSENTIAL HYPERTENSION: ICD-10-CM

## 2020-01-31 RX ORDER — AMLODIPINE BESYLATE 10 MG/1
10 TABLET ORAL
Qty: 90 TABLET | Refills: 3 | Status: SHIPPED | OUTPATIENT
Start: 2020-01-31 | End: 2021-03-15

## 2020-01-31 RX ORDER — LOSARTAN POTASSIUM 100 MG/1
100 TABLET ORAL DAILY
Qty: 90 TABLET | Refills: 3 | Status: SHIPPED | OUTPATIENT
Start: 2020-01-31 | End: 2020-03-09 | Stop reason: SDUPTHER

## 2020-01-31 NOTE — TELEPHONE ENCOUNTER
Pt of Dr. Tobar. Pt contacted office about her Rx for losartan and amlodipine. Pt notes she received Rx from [facility] and losartan is only listed at 50mg daily, but she has been taking 100mg daily. I reviewed chart, and it appears pt was directed to increase her losartan to 100mg daily back in December, but med list not updated to reflect this change.    I updated Rx and resent to [facility]. Pt also notes that she received Rx for amlodipine 5mg tablet take 2 tablets (10mg) daily. She would prefer to use the amlodipine 10mg tablet. I sent updated Rx. Thank you.

## 2020-03-09 ENCOUNTER — TELEPHONE (OUTPATIENT)
Dept: SCHEDULING | Facility: CLINIC | Age: 70
End: 2020-03-09

## 2020-03-09 DIAGNOSIS — I10 ESSENTIAL HYPERTENSION: ICD-10-CM

## 2020-03-09 RX ORDER — LOSARTAN POTASSIUM 100 MG/1
100 TABLET ORAL DAILY
Qty: 90 TABLET | Refills: 1 | Status: SHIPPED | OUTPATIENT
Start: 2020-03-09 | End: 2020-09-02

## 2020-03-09 RX ORDER — METOPROLOL SUCCINATE 100 MG/1
100 TABLET, EXTENDED RELEASE ORAL DAILY
Qty: 90 TABLET | Refills: 1 | Status: SHIPPED | OUTPATIENT
Start: 2020-03-09 | End: 2020-09-18

## 2020-03-09 NOTE — TELEPHONE ENCOUNTER
Medicine Refill Request    Last Office Visit: Visit date not found  Next Office Visit: Visit date not found    losartan (COZAAR) 100 mg tablet  metoprolol succinate XL (TOPROL-XL) 100 mg 24 hr tablet    Current Outpatient Medications:   •  amLODIPine (NORVASC) 10 mg tablet, Take 1 tablet (10 mg total) by mouth once daily., Disp: 90 tablet, Rfl: 3  •  losartan (COZAAR) 100 mg tablet, Take 1 tablet (100 mg total) by mouth daily., Disp: 90 tablet, Rfl: 3  •  metoprolol succinate XL (TOPROL-XL) 100 mg 24 hr tablet, Take 100 mg by mouth daily., Disp: , Rfl:       BP Readings from Last 3 Encounters:   11/04/19 (!) 148/80   10/14/19 140/82   10/14/19 140/82       Recent Lab results:  No results found for: CHOL, No results found for: HDL, No results found for: LDLCALC, No results found for: TRIG     No results found for: GLUCOSE, No results found for: HGBA1C      No results found for: CREATININE    No results found for: TSH

## 2020-07-06 NOTE — TELEPHONE ENCOUNTER
Call received from Ajay with [facility] asking for clarification on Amlodipine prescriptions sent by our office as follows:    1/17/20-Amlodipine 5mg tablets, two tablets (total 10mg) daily.    1/31/20-Amlodipine 10mg tablet, one daily.    I told pharmacy that patient requested use of 10mg strength so pharmacy will remove the 5mg strength-two daily from patient profile.    Just an FYI-no call back needed however if you want a # for [facility] it's [phone] reference # [id number], thanks.

## 2020-09-03 ENCOUNTER — TELEPHONE (OUTPATIENT)
Dept: SCHEDULING | Facility: CLINIC | Age: 70
End: 2020-09-03

## 2020-09-03 NOTE — TELEPHONE ENCOUNTER
Pt request call back re Appt. Sooner than Nov. Offered.   Pt #[phone] today in afternoon .   Pt H# [phone]

## 2020-09-03 NOTE — TELEPHONE ENCOUNTER
Called simona back. [facility] in [place] with Dr Gore is November 9th at 2:00pm. I did advise that if she needs to come sooner we could schedule her with Becka. LM for Simona to call back. THX

## 2020-09-03 NOTE — TELEPHONE ENCOUNTER
Pt called to Schedule with Dr. Tobar - Pt states [place] to [place] and wanted to schedule there instead.   Pt is scheduled for 9/18/2020 @ [facility].     Pt can be reached @ [phone].     TY

## 2020-09-18 ENCOUNTER — DOCUMENTATION (OUTPATIENT)
Dept: CARDIOLOGY | Facility: CLINIC | Age: 70
End: 2020-09-18

## 2020-09-18 ENCOUNTER — OFFICE VISIT (OUTPATIENT)
Dept: CARDIOLOGY | Facility: CLINIC | Age: 70
End: 2020-09-18
Payer: COMMERCIAL

## 2020-09-18 VITALS
BODY MASS INDEX: 32.75 KG/M2 | RESPIRATION RATE: 16 BRPM | HEART RATE: 60 BPM | HEIGHT: 66 IN | SYSTOLIC BLOOD PRESSURE: 160 MMHG | WEIGHT: 203.8 LBS | DIASTOLIC BLOOD PRESSURE: 82 MMHG

## 2020-09-18 DIAGNOSIS — R00.1 SINUS BRADYCARDIA: ICD-10-CM

## 2020-09-18 DIAGNOSIS — I10 ESSENTIAL HYPERTENSION: Primary | ICD-10-CM

## 2020-09-18 DIAGNOSIS — R06.02 SHORTNESS OF BREATH: ICD-10-CM

## 2020-09-18 PROCEDURE — 93000 ELECTROCARDIOGRAM COMPLETE: CPT | Performed by: INTERNAL MEDICINE

## 2020-09-18 PROCEDURE — 99214 OFFICE O/P EST MOD 30 MIN: CPT | Performed by: INTERNAL MEDICINE

## 2020-09-18 RX ORDER — NEBIVOLOL 20 MG/1
20 TABLET ORAL DAILY
Qty: 28 TABLET | Refills: 0 | COMMUNITY
Start: 2020-09-18 | End: 2020-10-09 | Stop reason: SDUPTHER
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
## 2020-10-09 ENCOUNTER — OFFICE VISIT (OUTPATIENT)
Dept: CARDIOLOGY | Facility: CLINIC | Age: 70
End: 2020-10-09
Payer: COMMERCIAL

## 2020-10-09 ENCOUNTER — TELEPHONE (OUTPATIENT)
Dept: CARDIOLOGY | Facility: CLINIC | Age: 70
End: 2020-10-09

## 2020-10-09 ENCOUNTER — DOCUMENTATION (OUTPATIENT)
Dept: CARDIOLOGY | Facility: CLINIC | Age: 70
End: 2020-10-09

## 2020-10-09 VITALS
HEIGHT: 66 IN | HEART RATE: 65 BPM | WEIGHT: 205 LBS | SYSTOLIC BLOOD PRESSURE: 140 MMHG | BODY MASS INDEX: 32.95 KG/M2 | DIASTOLIC BLOOD PRESSURE: 70 MMHG | OXYGEN SATURATION: 98 %

## 2020-10-09 DIAGNOSIS — R06.02 SHORTNESS OF BREATH: ICD-10-CM

## 2020-10-09 DIAGNOSIS — I10 ESSENTIAL HYPERTENSION: Primary | ICD-10-CM

## 2020-10-09 DIAGNOSIS — R00.1 SINUS BRADYCARDIA: ICD-10-CM

## 2020-10-09 PROCEDURE — 99214 OFFICE O/P EST MOD 30 MIN: CPT | Performed by: INTERNAL MEDICINE

## 2020-10-09 RX ORDER — NEBIVOLOL 20 MG/1
20 TABLET ORAL DAILY
Qty: 90 TABLET | Refills: 3 | Status: SHIPPED | OUTPATIENT
Start: 2020-10-09 | End: 2021-09-13

## 2020-10-09 NOTE — LETTER
October 12, 2020     Cris Quiroz MD  [address] [address]    Patient: Joie Sullivan  YOB: 1950  Date of Visit: 10/9/2020      Dear Dr. Quiroz:    Thank you for referring Joie Sullivan to me for evaluation. Below are my notes for this consultation.    If you have questions, please do not hesitate to call me. I look forward to following your patient along with you.         Sincerely,        Amy Montero MD        CC: No Recipients  Amy Ken MD  10/12/2020  1:19 PM  Signed       Cardiology Office Visit     Patient ID: Joie Sullivan 69 y.o. female 1950  PCP: Cris Quiroz MD   History Present Illness     Joie Sullivan returns to the office today for follow-up of her uncontrolled hypertension.  I saw her a few weeks ago at which time her blood pressure was dated and she was experiencing considerable life stress, both at work and at home.  We started her on Bystolic 20 mg daily along with amlodipine 10 mg daily and losartan 100 mg daily.  She says that she feels noticeably better and denies any chest pain, shortness of breath or palpitations.  She believes that her blood pressures have been better controlled.  Joie is still somewhat anxious, but much better than at our last visit.    Past History   History review: Pertinent allergies, medications, medical history, surgical history, social history and family history reviewed and updated appropriately.    Allergies/Medications   Allergies:Patient has no known allergies.    Medications:  Outpatient Encounter Medications as of 10/9/2020:   •  amLODIPine (NORVASC) 10 mg tablet, Take 1 tablet (10 mg total) by mouth once daily.  •  losartan (COZAAR) 100 mg tablet, Take 1 tablet (100 mg total) by mouth daily.  •  nebivoloL (BYSTOLIC) 20 mg tablet, Take 1 tablet (20 mg total) by mouth daily.  •  [DISCONTINUED] nebivoloL (BYSTOLIC) 20 mg tablet, Take 1 tablet (20 mg total) by mouth  "daily.  ROS/Physical Exam   ROS  Pertinent items are noted in HPI.  Comprehensive (12+ point) ROS otherwise negative.  Vitals:    10/09/20 0948 10/09/20 0957 10/09/20 0958   BP: (!) 142/80 136/76 140/70   BP Location: Left upper arm Left upper arm Right upper arm   Patient Position: Sitting     Pulse: 65     SpO2: 98%     Weight: 93 kg (205 lb)     Height: 1.676 m (5' 6\")       BP Readings from Last 3 Encounters:   10/09/20 140/70   09/18/20 (!) 160/82   11/04/19 (!) 148/80     Wt Readings from Last 3 Encounters:   10/09/20 93 kg (205 lb)   09/18/20 92.4 kg (203 lb 12.8 oz)   11/04/19 91.5 kg (201 lb 12.8 oz)     Physical Exam   Constitutional: She is oriented to person, place, and time. She appears well-developed and well-nourished. She is cooperative.   Overweight white female in no apparent distress   HENT:   Head: Normocephalic and atraumatic.   Mouth/Throat: Oropharynx is clear and moist and mucous membranes are normal.   Eyes: Conjunctivae and EOM are normal. No scleral icterus.   Neck: Normal range of motion. Neck supple. No hepatojugular reflux and no JVD present. Carotid bruit is not present. No thyromegaly present.   Cardiovascular: Normal rate, regular rhythm, S1 normal, S2 normal and intact distal pulses.  No extrasystoles are present. PMI is not displaced. Exam reveals gallop and S4. Exam reveals no S3 and no friction rub.   Murmur heard.  Pulses:       Carotid pulses are 2+ on the right side and 2+ on the left side.       Posterior tibial pulses are 2+ on the right side and 2+ on the left side.   There is a 1/6 systolic murmur at the right and left sternal borders.   Pulmonary/Chest: Effort normal and breath sounds normal. She has no wheezes. She has no rhonchi. She has no rales.   Abdominal: Soft. Bowel sounds are normal. She exhibits no abdominal bruit and no pulsatile midline mass. There is no abdominal tenderness.   Musculoskeletal: Normal range of motion.         General: No tenderness or " deformity.   Neurological: She is alert and oriented to person, place, and time. No cranial nerve deficit.   Skin: Skin is warm, dry and intact. No rash noted. No cyanosis. Nails show no clubbing.   Psychiatric: Her speech is normal and behavior is normal. Her mood appears anxious.     Labs/Imaging/Procedures   Labs  10/4/2019: WBC 8.7, hgb 12.7, platelets 291,000, glucose 161, BUN 11, creat 0.9, LFTs normal.  Total cholesterol 143, trigs 79, HDL 40, LDL 79 mg/dL.  Hemoglobin A1c 6.7.  TSH 1.9.  ESR 36     Imaging  Echo 10/14/2019: TDS.  Small LV cavity.  Mild concentric LVH.  LVEF 70-75%.  Abnormal septal motion but otherwise normal wall LVOT gradient.  Impaired relaxation.  Normal RV size and function.  Trace TR.  No pulmonary hypertension by Doppler.  Trace MR.  Prominent pericardial fat pad.    EKG was not performed today  Assessment/Plan     1. Essential hypertension  Much better after changing her to Bystolic 20 mg daily.  Patient was given additional samples and a prescription today.  She will continue losartan 100 g daily and amlodipine 10 mg daily.  We have discussed the importance of a low-salt diet and trying to increase her physical activity to promote weight loss.    2. Sinus bradycardia  Her heart rate is still around 60 bpm on Bystolic 20 mg daily.  No additional recommendations at this time.    3. Shortness of breath  Her dyspnea on exertion has improved somewhat with control of her blood pressure, but still needs further evaluation.  I have asked her to schedule a pharmacologic nuclear stress test as well as an echocardiogram in the next several weeks.  She does not think she would be able to walk on a treadmill secondary to orthopedic issues.  I would also like to assess her left and right ventricular function and filling pressures with echocardiography.  Joie is agreeable to this and will schedule shortly.  - Transthoracic echo (TTE) complete; Future  - CV Nuclear Cardiology MPI Pharm Stress;  Future    Return in about 6 months (around 4/9/2021) for follow-up, earlier if any change in symptoms.  I have reviewed the patient's medical history in detail and updated the computerized patient record.  Thank you for allowing me to participate in the care of this patient.  I hope this information is helpful.  Social distancing and careful monitoring for any COVID symptoms discussed with patient.      Amy Montero MD [facility], FASE  10/12/2020  1:16 PM  This document was generated utilizing voice recognition technology. Please excuse any typographical errors which may be present.

## 2020-10-09 NOTE — TELEPHONE ENCOUNTER
Please precert for a pharm nuc med test and transthoracic echo.  Pt is going to [facility] [address] to have both tests done, testing date:  12-8-20.

## 2020-10-12 NOTE — PROGRESS NOTES
"     Cardiology Office Visit     Patient ID: Joie Sullivan 69 y.o. female 1950  PCP: Cris Quiroz MD   History Present Illness     Joie Sullivan returns to the office today for follow-up of her uncontrolled hypertension.  I saw her a few weeks ago at which time her blood pressure was dated and she was experiencing considerable life stress, both at work and at home.  We started her on Bystolic 20 mg daily along with amlodipine 10 mg daily and losartan 100 mg daily.  She says that she feels noticeably better and denies any chest pain, shortness of breath or palpitations.  She believes that her blood pressures have been better controlled.  Joie is still somewhat anxious, but much better than at our last visit.    Past History   History review: Pertinent allergies, medications, medical history, surgical history, social history and family history reviewed and updated appropriately.    Allergies/Medications   Allergies:Patient has no known allergies.    Medications:  Outpatient Encounter Medications as of 10/9/2020:   •  amLODIPine (NORVASC) 10 mg tablet, Take 1 tablet (10 mg total) by mouth once daily.  •  losartan (COZAAR) 100 mg tablet, Take 1 tablet (100 mg total) by mouth daily.  •  nebivoloL (BYSTOLIC) 20 mg tablet, Take 1 tablet (20 mg total) by mouth daily.  •  [DISCONTINUED] nebivoloL (BYSTOLIC) 20 mg tablet, Take 1 tablet (20 mg total) by mouth daily.  ROS/Physical Exam   ROS  Pertinent items are noted in HPI.  Comprehensive (12+ point) ROS otherwise negative.  Vitals:    10/09/20 0948 10/09/20 0957 10/09/20 0958   BP: (!) 142/80 136/76 140/70   BP Location: Left upper arm Left upper arm Right upper arm   Patient Position: Sitting     Pulse: 65     SpO2: 98%     Weight: 93 kg (205 lb)     Height: 1.676 m (5' 6\")       BP Readings from Last 3 Encounters:   10/09/20 140/70   09/18/20 (!) 160/82   11/04/19 (!) 148/80     Wt Readings from Last 3 Encounters:   10/09/20 93 kg (205 lb)   09/18/20 92.4 kg " (203 lb 12.8 oz)   11/04/19 91.5 kg (201 lb 12.8 oz)     Physical Exam   Constitutional: She is oriented to person, place, and time. She appears well-developed and well-nourished. She is cooperative.   Overweight white female in no apparent distress   HENT:   Head: Normocephalic and atraumatic.   Mouth/Throat: Oropharynx is clear and moist and mucous membranes are normal.   Eyes: Conjunctivae and EOM are normal. No scleral icterus.   Neck: Normal range of motion. Neck supple. No hepatojugular reflux and no JVD present. Carotid bruit is not present. No thyromegaly present.   Cardiovascular: Normal rate, regular rhythm, S1 normal, S2 normal and intact distal pulses.  No extrasystoles are present. PMI is not displaced. Exam reveals gallop and S4. Exam reveals no S3 and no friction rub.   Murmur heard.  Pulses:       Carotid pulses are 2+ on the right side and 2+ on the left side.       Posterior tibial pulses are 2+ on the right side and 2+ on the left side.   There is a 1/6 systolic murmur at the right and left sternal borders.   Pulmonary/Chest: Effort normal and breath sounds normal. She has no wheezes. She has no rhonchi. She has no rales.   Abdominal: Soft. Bowel sounds are normal. She exhibits no abdominal bruit and no pulsatile midline mass. There is no abdominal tenderness.   Musculoskeletal: Normal range of motion.         General: No tenderness or deformity.   Neurological: She is alert and oriented to person, place, and time. No cranial nerve deficit.   Skin: Skin is warm, dry and intact. No rash noted. No cyanosis. Nails show no clubbing.   Psychiatric: Her speech is normal and behavior is normal. Her mood appears anxious.     Labs/Imaging/Procedures   Labs  10/4/2019: WBC 8.7, hgb 12.7, platelets 291,000, glucose 161, BUN 11, creat 0.9, LFTs normal.  Total cholesterol 143, trigs 79, HDL 40, LDL 79 mg/dL.  Hemoglobin A1c 6.7.  TSH 1.9.  ESR 36     Imaging  Echo 10/14/2019: TDS.  Small LV cavity.  Mild  concentric LVH.  LVEF 70-75%.  Abnormal septal motion but otherwise normal wall LVOT gradient.  Impaired relaxation.  Normal RV size and function.  Trace TR.  No pulmonary hypertension by Doppler.  Trace MR.  Prominent pericardial fat pad.    EKG was not performed today  Assessment/Plan     1. Essential hypertension  Much better after changing her to Bystolic 20 mg daily.  Patient was given additional samples and a prescription today.  She will continue losartan 100 g daily and amlodipine 10 mg daily.  We have discussed the importance of a low-salt diet and trying to increase her physical activity to promote weight loss.    2. Sinus bradycardia  Her heart rate is still around 60 bpm on Bystolic 20 mg daily.  No additional recommendations at this time.    3. Shortness of breath  Her dyspnea on exertion has improved somewhat with control of her blood pressure, but still needs further evaluation.  I have asked her to schedule a pharmacologic nuclear stress test as well as an echocardiogram in the next several weeks.  She does not think she would be able to walk on a treadmill secondary to orthopedic issues.  I would also like to assess her left and right ventricular function and filling pressures with echocardiography.  Joie is agreeable to this and will schedule shortly.  - Transthoracic echo (TTE) complete; Future  - CV Nuclear Cardiology MPI Pharm Stress; Future    Return in about 6 months (around 4/9/2021) for follow-up, earlier if any change in symptoms.  I have reviewed the patient's medical history in detail and updated the computerized patient record.  Thank you for allowing me to participate in the care of this patient.  I hope this information is helpful.  Social distancing and careful monitoring for any COVID symptoms discussed with patient.      Amy Montero MD [facility], FASE  10/12/2020  1:16 PM  This document was generated utilizing voice recognition technology. Please excuse any typographical errors  which may be present.

## 2020-11-05 NOTE — TELEPHONE ENCOUNTER
Pt is requesting to r/s all of  her test on 12/08. Pt states she will still be having dental work done during this time and would like to push these appts out further pt can be reached back at [phone]

## 2020-11-06 NOTE — TELEPHONE ENCOUNTER
Spoke to simona, In the middle of complicated dental work. Canceling for now will r/s when she is done. CALEB

## 2021-03-24 ENCOUNTER — TELEPHONE (OUTPATIENT)
Dept: SCHEDULING | Facility: CLINIC | Age: 71
End: 2021-03-24

## 2021-03-24 DIAGNOSIS — I10 ESSENTIAL HYPERTENSION: ICD-10-CM

## 2021-03-24 RX ORDER — LOSARTAN POTASSIUM 100 MG/1
100 TABLET ORAL
Qty: 90 TABLET | Refills: 4 | Status: SHIPPED | OUTPATIENT
Start: 2021-03-24 | End: 2022-02-14

## 2021-03-24 NOTE — TELEPHONE ENCOUNTER
Medicine Refill Request    Last Office Visit: Visit date not found  Last Telemedicine Visit: Visit date not found    Next Office Visit: Visit date not found  Next Telemedicine Visit: Visit date not found     losartan (COZAAR) 100 mg tablet      Current Outpatient Medications:   •  amLODIPine (NORVASC) 10 mg tablet, TAKE 1 TABLET DAILY, Disp: 90 tablet, Rfl: 3  •  losartan (COZAAR) 100 mg tablet, Take 1 tablet (100 mg total) by mouth once daily., Disp: 90 tablet, Rfl: 4  •  nebivoloL (BYSTOLIC) 20 mg tablet, Take 1 tablet (20 mg total) by mouth daily., Disp: 90 tablet, Rfl: 3      BP Readings from Last 3 Encounters:   10/09/20 140/70   09/18/20 (!) 160/82   11/04/19 (!) 148/80       Recent Lab results:  No results found for: CHOL, No results found for: HDL, No results found for: LDLCALC, No results found for: TRIG     No results found for: GLUCOSE, No results found for: HGBA1C      No results found for: CREATININE    No results found for: TSH

## 2021-03-25 ENCOUNTER — TELEPHONE (OUTPATIENT)
Dept: CARDIOLOGY | Facility: CLINIC | Age: 71
End: 2021-03-25

## 2021-03-25 NOTE — TELEPHONE ENCOUNTER
Left message for patient to call regarding testing that was ordered by Dr. Gore at patient's last visit.  Echo and Nuclear Pharm Stress Test.  They were scheduled at [facility],  but cancelled.  Patient needs to let us know if she is planning to have these before her visit of 4/9/21.

## 2021-03-25 NOTE — TELEPHONE ENCOUNTER
She needs to schedule the studies before her follow-up visit.  If she can do that before April 9, she does not need to change the appointment, otherwise she she should move it back a few weeks

## 2021-03-25 NOTE — TELEPHONE ENCOUNTER
Pt seen 10/9/2020 and you ordered a nuc pharm stress test and echo which she scheduled for Dec., but cancelled; have not been rescheduled.  Do you want her to keep the 4/9/21 visit or reschedule and have the testing done ?  Thank you

## 2021-03-26 NOTE — TELEPHONE ENCOUNTER
Patient requesting call back in regards to previous messages and states she can be reached at her work # [phone] until 5 PM today

## 2021-03-26 NOTE — TELEPHONE ENCOUNTER
I returned this call to the patient-encountered voicemail.  I told her I am not quite sure why her coworkers are telling her to not have the stress test performed, so it is difficult for me to answer the question.  My recommendation would be for her to proceed with the work-up as prescribed.  I asked her to call back if she has any additional questions or concerns.

## 2021-03-26 NOTE — TELEPHONE ENCOUNTER
I spoke to Joie - she tells me that her co-workers told her that she should do the treadmill and not pharm.  She tells me that she thinks that she can do the treadmill - she walks every night but does have arthritis in her knee.  I told her that we could start with the treadmill and, if needed, could switch to the pharmacologic study.  She is OK with this plan.  I reminded her to hold her bystolic.

## 2021-03-26 NOTE — TELEPHONE ENCOUNTER
I returned a call to Joie at the number listed.  I was told that she was not available, she was working.  I told him that I was returning a call to her and to give her a message to call us back.

## 2021-03-26 NOTE — TELEPHONE ENCOUNTER
Pt states her co workers are telling her not to get the nuclear stress test, and only to get the transthoracic echo, pt wants to know from Dr. Tobar what she should do    [phone]

## 2021-04-05 ENCOUNTER — APPOINTMENT (OUTPATIENT)
Dept: CARDIOLOGY | Facility: CLINIC | Age: 71
End: 2021-04-05
Attending: INTERNAL MEDICINE
Payer: COMMERCIAL

## 2021-04-05 ENCOUNTER — HOSPITAL ENCOUNTER (OUTPATIENT)
Dept: CARDIOLOGY | Facility: CLINIC | Age: 71
Setting detail: NUCLEAR MEDICINE
Discharge: HOME | End: 2021-04-05
Attending: INTERNAL MEDICINE
Payer: COMMERCIAL

## 2021-04-05 ENCOUNTER — APPOINTMENT (OUTPATIENT)
Dept: CARDIOLOGY | Facility: CLINIC | Age: 71
Setting detail: NUCLEAR MEDICINE
End: 2021-04-05
Attending: INTERNAL MEDICINE
Payer: COMMERCIAL

## 2021-04-05 VITALS
SYSTOLIC BLOOD PRESSURE: 136 MMHG | HEIGHT: 66 IN | WEIGHT: 205 LBS | DIASTOLIC BLOOD PRESSURE: 78 MMHG | HEART RATE: 65 BPM | BODY MASS INDEX: 32.95 KG/M2

## 2021-04-05 DIAGNOSIS — R06.02 SHORTNESS OF BREATH: ICD-10-CM

## 2021-04-05 LAB
CV NM TETROFOSMIN REST DOSE: 10.5 MCI
CV NM TETROFOSMIN STRESS DOSE: 32.5 MCI
STRESS BASELINE BP: NORMAL MMHG
STRESS BASELINE HR: 54 BPM
STRESS ECHO POST RECOVERY HR: 80 BPM
STRESS PERCENT HR: 56 %
STRESS POST PEAK BP: NORMAL MMHG
STRESS POST PEAK HR: 85 BPM
STRESS TARGET HR: 128 BPM

## 2021-04-05 PROCEDURE — A9502 TC99M TETROFOSMIN: HCPCS | Performed by: INTERNAL MEDICINE

## 2021-04-05 PROCEDURE — G1004 CDSM NDSC: HCPCS | Performed by: INTERNAL MEDICINE

## 2021-04-05 PROCEDURE — 93015 CV STRESS TEST SUPVJ I&R: CPT | Performed by: INTERNAL MEDICINE

## 2021-04-05 PROCEDURE — 200200 CV NUCLEAR CARDIOLOGY MPI STRESS PHARM: Performed by: INTERNAL MEDICINE

## 2021-04-05 PROCEDURE — 93306 TTE W/DOPPLER COMPLETE: CPT | Performed by: INTERNAL MEDICINE

## 2021-04-05 PROCEDURE — 78452 HT MUSCLE IMAGE SPECT MULT: CPT | Mod: MG | Performed by: INTERNAL MEDICINE

## 2021-04-05 RX ORDER — REGADENOSON 0.08 MG/ML
0.4 INJECTION, SOLUTION INTRAVENOUS ONCE
Status: COMPLETED | OUTPATIENT
Start: 2021-04-05 | End: 2021-04-05

## 2021-04-05 RX ADMIN — REGADENOSON 0.4 MG: 0.08 INJECTION, SOLUTION INTRAVENOUS at 09:30

## 2021-04-06 LAB
AORTIC ROOT ANNULUS: 3.5 CM
ASCENDING AORTA: 3 CM
AV PEAK GRADIENT: 8 MMHG
AV PEAK VELOCITY-S: 1.4 M/S
AV VALVE AREA: 2.35 CM2
BSA FOR ECHO PROCEDURE: 2.08 M2
E WAVE DECELERATION TIME: 187 MS
E/A RATIO: 2.3
E/E' RATIO: 20.2
E/LAT E' RATIO: 8.7
EDV (BP): 84.3 CM3
EF (A4C): 66.8 %
EF A2C: 63.2 %
EJECTION FRACTION: 66.7 %
EST RIGHT VENT SYSTOLIC PRESSURE BY TRICUSPID REGURGITATION JET: 41 MMHG
ESV (BP): 28.1 CM3
FRACTIONAL SHORTENING: 38.2 %
INTERVENTRICULAR SEPTUM: 1.04 CM
LA ESV (BP): 72.8 CM3
LA ESV INDEX (A2C): 34.42 CM3/M2
LA ESV INDEX (BP): 35 CM3/M2
LA/AORTA RATIO: 1.43
LAAS-AP2: 20.2 CM2
LAAS-AP4: 23.5 CM2
LAD 2D: 5 CM
LALD A4C: 5.11 CM
LALD A4C: 5.13 CM
LAV-S: 71.6 CM3
LEFT ATRIUM VOLUME INDEX: 41.06 CM3/M2
LEFT ATRIUM VOLUME: 85.4 CM3
LEFT INTERNAL DIMENSION IN SYSTOLE: 2.9 CM (ref 2.97–4.5)
LEFT VENTRICLE DIASTOLIC VOLUME INDEX: 44.42 CM3/M2
LEFT VENTRICLE DIASTOLIC VOLUME: 92.4 CM3
LEFT VENTRICLE SYSTOLIC VOLUME INDEX: 14.76 CM3/M2
LEFT VENTRICLE SYSTOLIC VOLUME: 30.7 CM3
LEFT VENTRICULAR INTERNAL DIMENSION IN DIASTOLE: 4.69 CM (ref 5.06–7.02)
LEFT VENTRICULAR POSTERIOR WALL IN END DIASTOLE: 0.87 CM (ref 0.65–1.22)
LV DIASTOLIC VOLUME: 68 CM3
LV ESV (APICAL 2 CHAMBER): 25.1 CM3
LVAD-AP2: 23.5 CM2
LVAD-AP4: 29.7 CM2
LVAS-AP2: 13.4 CM2
LVAS-AP4: 14.9 CM2
LVEDVI(A2C): 32.69 CM3/M2
LVEDVI(BP): 40.53 CM3/M2
LVESVI(A2C): 12.07 CM3/M2
LVESVI(BP): 13.51 CM3/M2
LVLD-AP2: 6.67 CM
LVLD-AP4: 7.57 CM
LVLS-AP2: 5.79 CM
LVLS-AP4: 6 CM
LVOT 2D: 1.9 CM
LVOT A: 2.84 CM2
LVOT PEAK VELOCITY: 1.16 M/S
MV E'TISSUE VEL-LAT: 0.13 M/S
MV E'TISSUE VEL-MED: 0.06 M/S
MV PEAK A VEL: 0.51 M/S
MV PEAK E VEL: 1.17 M/S
POSTERIOR WALL: 0.87 CM
RAP: 5 MMHG
RVOT VMAX: 0.84 M/S
SEPTAL TISSUE DOPPLER FREE WALL LATE DIA VELOCITY (APICAL 4 CHAMBER VIEW): 0.13 M/S
TR MAX PG: 36 MMHG
TRICUSPID VALVE PEAK REGURGITATION VELOCITY: 2.99 M/S
Z-SCORE OF LEFT VENTRICULAR DIMENSION IN END DIASTOLE: -2.4
Z-SCORE OF LEFT VENTRICULAR DIMENSION IN END SYSTOLE: -1.83
Z-SCORE OF LEFT VENTRICULAR POSTERIOR WALL IN END DIASTOLE: -0.11

## 2021-04-09 ENCOUNTER — OFFICE VISIT (OUTPATIENT)
Dept: CARDIOLOGY | Facility: CLINIC | Age: 71
End: 2021-04-09
Payer: COMMERCIAL

## 2021-04-09 VITALS
BODY MASS INDEX: 32.56 KG/M2 | OXYGEN SATURATION: 99 % | SYSTOLIC BLOOD PRESSURE: 140 MMHG | HEIGHT: 66 IN | WEIGHT: 202.6 LBS | HEART RATE: 61 BPM | DIASTOLIC BLOOD PRESSURE: 66 MMHG

## 2021-04-09 DIAGNOSIS — R00.1 SINUS BRADYCARDIA: ICD-10-CM

## 2021-04-09 DIAGNOSIS — R94.39 ABNORMAL STRESS TEST: ICD-10-CM

## 2021-04-09 DIAGNOSIS — I10 ESSENTIAL HYPERTENSION: Primary | ICD-10-CM

## 2021-04-09 DIAGNOSIS — R06.02 SHORTNESS OF BREATH: ICD-10-CM

## 2021-04-09 PROCEDURE — 99215 OFFICE O/P EST HI 40 MIN: CPT | Performed by: INTERNAL MEDICINE

## 2021-04-09 PROCEDURE — 3008F BODY MASS INDEX DOCD: CPT | Performed by: INTERNAL MEDICINE

## 2021-04-09 PROCEDURE — 93000 ELECTROCARDIOGRAM COMPLETE: CPT | Performed by: INTERNAL MEDICINE

## 2021-04-09 PROCEDURE — 3078F DIAST BP <80 MM HG: CPT | Performed by: INTERNAL MEDICINE

## 2021-04-09 PROCEDURE — 3077F SYST BP >= 140 MM HG: CPT | Performed by: INTERNAL MEDICINE

## 2021-04-09 RX ORDER — AMLODIPINE BESYLATE 10 MG/1
10 TABLET ORAL
Qty: 90 TABLET | Refills: 3
Start: 2021-04-09 | End: 2021-10-15

## 2021-04-09 NOTE — H&P (VIEW-ONLY)
"     Cardiology Office Visit     Patient ID: Joie Sullivan 70 y.o. female 1950  PCP: Cris Quiroz MD   History Present Illness     Joie Sullivan returns to the office today for follow-up of her difficult to control hypertension, sinus bradycardia, dyspnea on exertion and recently an abnormal stress test.  Joseph was recently seen in our office at which time she had a repeat echocardiogram and pharmacologic nuclear stress test.  She had been a bit reluctant to undergo stress testing because \"people at work with told me it was horrible\" but she now says she did not find the test difficult to tolerate and does not understand why people told her this.  Her echo showed hyperdynamic LV function with evidence of elevated filling pressures.  There is possibly a small PFO by color Doppler, but agitated saline contrast was not administered.  Her stress test did not show any pharmacologic EKG changes or arrhythmia, but there was a moderate sized anterior defect with at least partial reversibility.  Although this could be soft tissue/breast attenuation, findings were concerning for ischemia in the territory of the LAD.  LV function was preserved.    We reviewed her stress test findings.  She does not have any significant change in her exercise capacity, but admits that she was not exercising as much during Covid restrictions.  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  There is no PND or orthopnea.  We also discussed her echocardiogram findings in detail.    Allergies/Medications   Allergies:Patient has no known allergies.    Medications:  Current Outpatient Medications   Medication Instructions   • amLODIPine (NORVASC) 10 mg, oral, Daily (6a)   • losartan (COZAAR) 100 mg, oral, Daily (6a)   • nebivoloL (BYSTOLIC) 20 mg, oral, Daily      Physical Exam     Vitals:    04/09/21 0912   BP: 140/66   BP Location: Left upper arm   Patient Position: Sitting   Pulse: 61 " "  SpO2: 99%   Weight: 91.9 kg (202 lb 9.6 oz)   Height: 1.676 m (5' 6\")     BP Readings from Last 3 Encounters:   04/09/21 140/66   04/05/21 136/78   10/09/20 140/70     Wt Readings from Last 3 Encounters:   04/09/21 91.9 kg (202 lb 9.6 oz)   04/05/21 93 kg (205 lb)   10/09/20 93 kg (205 lb)      Physical Exam:  Constitutional: Well developed.  No apparent distress  Eyes: No icterus  ENT:  Mucosa moist  Neck: Supple, no JVD or hepatojugular reflux.  No bruits.  Cardiac: Normal S1 and S2, regular rate and rhythm, no S3.  No rub.  No ectopy.    Lungs: Clear to auscultation bilaterally.  No wheezing.  Abdomen: Soft, nontender, positive normoactive bowel sounds.  No bruit.  Extremities: No clubbing or cyanosis.  No calf tenderness.  No edema.  Distal pulses are intact.  Skin: Warm and dry.  No jaundice.  Musculoskeletal: No joint swelling or erythema.  Neurologic: Awake, alert, oriented.  Moving all extremities.  Psychiatric: No agitation.    Labs/Imaging/Procedures   Labs  No recent lab work available.    Imaging  Pharmacologic MPI 4/5/2021: Negative pharmacologic EKG.  LVEF 70%.  Moderate-sized anterior defect with some reversibility suggestive of LAD ischemia.    Echo 4/5/2021: TDS.  Normal LV size.  Top normal wall thickness.  LVEF 65-70% with grossly normal wall motion.  Normal RV size and function.  Mild TR.  PASP 40-45 mmHg.  Moderately dilated left atrium.  Inflow suggestive of elevated filling pressures.  Probable PFO by color Doppler-no agitated saline was administered.  Trileaflet sclerotic aortic valve.  No aortic stenosis.  Trace AI.  Mild MR.  No significant pericardial effusion.  Compared to prior study October 2019, pulmonary pressures have increased.    EKG  Assessment/Plan     1. Essential hypertension  Continue amlodipine 10 mg daily, losartan 100 mg daily and nebivolol 20 mg daily.  Discussed sodium restriction, maintaining ideal body weight and regular exercise program with a goal of 150 minutes of " moderate intensity aerobic exercise per week as physiologic means to help achieve blood pressure control.  The patient has been counseled about avoiding salty foods including bread and rolls, pizza, sandwiches, cold cuts, canned or powdered soups and prepared foods.  A Mediterranean style of eating incorporating vegetables, fruits, whole grains, plant-based proteins, lean animal proteins and fish is recommended.  Medical therapy discussed.    2. Sinus bradycardia  Asymptomatic sinus bradycardia.  No additional intervention.    3. Shortness of breath  Shortness of breath seems to be at baseline, but she admits that she has not been engaged in much exercise.  She would benefit from increased physical activity and weight loss after we complete investigation of her abnormal stress test.    4. Abnormal stress test  We reviewed the stress test results in detail as well as options for further investigation.  We discussed the risk, benefits and alternatives of ongoing medical management versus coronary CTA and cardiac catheterization.  She would prefer to proceed with cardiac catheterization which I think is appropriate.  We can also assess her filling pressures with right heart catheterization.  All questions were answered.    Return in about 1 month (around 5/9/2021) for follow-up, earlier if any change in symptoms.  Available medical records reviewed and updated including laboratory data, imaging studies, previous outpatient and inpatient records.  Counseling/education performed. Care everywhere utilized where appropriate.  I spent 40 minutes on the date of service performing the specified activities.  Thank you for allowing me to participate in the care of this patient.  I hope this information is helpful.  Social distancing, masking, handwashing and careful monitoring for any COVID symptoms discussed with patient.      Amy Montero MD [facility], [facility]  4/9/2021  2:31 PM  This document was generated utilizing voice  recognition technology. Please excuse any typographical errors which may be present.

## 2021-04-09 NOTE — PROGRESS NOTES
"     Cardiology Office Visit     Patient ID: Joie Sullivan 70 y.o. female 1950  PCP: Cris Quiroz MD   History Present Illness     Joie Sullivan returns to the office today for follow-up of her difficult to control hypertension, sinus bradycardia, dyspnea on exertion and recently an abnormal stress test.  Joseph was recently seen in our office at which time she had a repeat echocardiogram and pharmacologic nuclear stress test.  She had been a bit reluctant to undergo stress testing because \"people at work with told me it was horrible\" but she now says she did not find the test difficult to tolerate and does not understand why people told her this.  Her echo showed hyperdynamic LV function with evidence of elevated filling pressures.  There is possibly a small PFO by color Doppler, but agitated saline contrast was not administered.  Her stress test did not show any pharmacologic EKG changes or arrhythmia, but there was a moderate sized anterior defect with at least partial reversibility.  Although this could be soft tissue/breast attenuation, findings were concerning for ischemia in the territory of the LAD.  LV function was preserved.    We reviewed her stress test findings.  She does not have any significant change in her exercise capacity, but admits that she was not exercising as much during Covid restrictions.  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  There is no PND or orthopnea.  We also discussed her echocardiogram findings in detail.    Allergies/Medications   Allergies:Patient has no known allergies.    Medications:  Current Outpatient Medications   Medication Instructions   • amLODIPine (NORVASC) 10 mg, oral, Daily (6a)   • losartan (COZAAR) 100 mg, oral, Daily (6a)   • nebivoloL (BYSTOLIC) 20 mg, oral, Daily      Physical Exam     Vitals:    04/09/21 0912   BP: 140/66   BP Location: Left upper arm   Patient Position: Sitting   Pulse: 61 " "  SpO2: 99%   Weight: 91.9 kg (202 lb 9.6 oz)   Height: 1.676 m (5' 6\")     BP Readings from Last 3 Encounters:   04/09/21 140/66   04/05/21 136/78   10/09/20 140/70     Wt Readings from Last 3 Encounters:   04/09/21 91.9 kg (202 lb 9.6 oz)   04/05/21 93 kg (205 lb)   10/09/20 93 kg (205 lb)      Physical Exam:  Constitutional: Well developed.  No apparent distress  Eyes: No icterus  ENT:  Mucosa moist  Neck: Supple, no JVD or hepatojugular reflux.  No bruits.  Cardiac: Normal S1 and S2, regular rate and rhythm, no S3.  No rub.  No ectopy.    Lungs: Clear to auscultation bilaterally.  No wheezing.  Abdomen: Soft, nontender, positive normoactive bowel sounds.  No bruit.  Extremities: No clubbing or cyanosis.  No calf tenderness.  No edema.  Distal pulses are intact.  Skin: Warm and dry.  No jaundice.  Musculoskeletal: No joint swelling or erythema.  Neurologic: Awake, alert, oriented.  Moving all extremities.  Psychiatric: No agitation.    Labs/Imaging/Procedures   Labs  No recent lab work available.    Imaging  Pharmacologic MPI 4/5/2021: Negative pharmacologic EKG.  LVEF 70%.  Moderate-sized anterior defect with some reversibility suggestive of LAD ischemia.    Echo 4/5/2021: TDS.  Normal LV size.  Top normal wall thickness.  LVEF 65-70% with grossly normal wall motion.  Normal RV size and function.  Mild TR.  PASP 40-45 mmHg.  Moderately dilated left atrium.  Inflow suggestive of elevated filling pressures.  Probable PFO by color Doppler-no agitated saline was administered.  Trileaflet sclerotic aortic valve.  No aortic stenosis.  Trace AI.  Mild MR.  No significant pericardial effusion.  Compared to prior study October 2019, pulmonary pressures have increased.    EKG  Assessment/Plan     1. Essential hypertension  Continue amlodipine 10 mg daily, losartan 100 mg daily and nebivolol 20 mg daily.  Discussed sodium restriction, maintaining ideal body weight and regular exercise program with a goal of 150 minutes of " moderate intensity aerobic exercise per week as physiologic means to help achieve blood pressure control.  The patient has been counseled about avoiding salty foods including bread and rolls, pizza, sandwiches, cold cuts, canned or powdered soups and prepared foods.  A Mediterranean style of eating incorporating vegetables, fruits, whole grains, plant-based proteins, lean animal proteins and fish is recommended.  Medical therapy discussed.    2. Sinus bradycardia  Asymptomatic sinus bradycardia.  No additional intervention.    3. Shortness of breath  Shortness of breath seems to be at baseline, but she admits that she has not been engaged in much exercise.  She would benefit from increased physical activity and weight loss after we complete investigation of her abnormal stress test.    4. Abnormal stress test  We reviewed the stress test results in detail as well as options for further investigation.  We discussed the risk, benefits and alternatives of ongoing medical management versus coronary CTA and cardiac catheterization.  She would prefer to proceed with cardiac catheterization which I think is appropriate.  We can also assess her filling pressures with right heart catheterization.  All questions were answered.    Return in about 1 month (around 5/9/2021) for follow-up, earlier if any change in symptoms.  Available medical records reviewed and updated including laboratory data, imaging studies, previous outpatient and inpatient records.  Counseling/education performed. Care everywhere utilized where appropriate.  I spent 40 minutes on the date of service performing the specified activities.  Thank you for allowing me to participate in the care of this patient.  I hope this information is helpful.  Social distancing, masking, handwashing and careful monitoring for any COVID symptoms discussed with patient.      Amy Montero MD [facility], [facility]  4/9/2021  2:31 PM  This document was generated utilizing voice  recognition technology. Please excuse any typographical errors which may be present.

## 2021-04-26 ENCOUNTER — APPOINTMENT (OUTPATIENT)
Dept: LAB | Facility: HOSPITAL | Age: 71
End: 2021-04-26
Attending: INTERNAL MEDICINE
Payer: COMMERCIAL

## 2021-04-26 DIAGNOSIS — R94.39 ABNORMAL STRESS TEST: ICD-10-CM

## 2021-04-26 DIAGNOSIS — Z01.812 PRE-PROCEDURE LAB EXAM: ICD-10-CM

## 2021-04-26 LAB
ANION GAP SERPL CALC-SCNC: 12 MEQ/L (ref 3–15)
BASOPHILS # BLD: 0.06 K/UL (ref 0.01–0.1)
BASOPHILS NFR BLD: 0.6 %
BUN SERPL-MCNC: 12 MG/DL (ref 8–20)
CALCIUM SERPL-MCNC: 9.4 MG/DL (ref 8.9–10.3)
CHLORIDE SERPL-SCNC: 102 MEQ/L (ref 98–109)
CO2 SERPL-SCNC: 27 MEQ/L (ref 22–32)
CREAT SERPL-MCNC: 0.6 MG/DL (ref 0.6–1.1)
DIFFERENTIAL METHOD BLD: ABNORMAL
EOSINOPHIL # BLD: 0.17 K/UL (ref 0.04–0.36)
EOSINOPHIL NFR BLD: 1.8 %
ERYTHROCYTE [DISTWIDTH] IN BLOOD BY AUTOMATED COUNT: 13 % (ref 11.7–14.4)
GFR SERPL CREATININE-BSD FRML MDRD: >60 ML/MIN/1.73M*2
GLUCOSE SERPL-MCNC: 99 MG/DL (ref 70–99)
HCT VFR BLDCO AUTO: 40.9 % (ref 35–45)
HGB BLD-MCNC: 13.1 G/DL (ref 11.8–15.7)
IMM GRANULOCYTES # BLD AUTO: 0.03 K/UL (ref 0–0.08)
IMM GRANULOCYTES NFR BLD AUTO: 0.3 %
INR PPP: 1
LYMPHOCYTES # BLD: 1.96 K/UL (ref 1.2–3.5)
LYMPHOCYTES NFR BLD: 21 %
MCH RBC QN AUTO: 27.8 PG (ref 28–33.2)
MCHC RBC AUTO-ENTMCNC: 32 G/DL (ref 32.2–35.5)
MCV RBC AUTO: 86.8 FL (ref 83–98)
MONOCYTES # BLD: 0.63 K/UL (ref 0.28–0.8)
MONOCYTES NFR BLD: 6.8 %
NEUTROPHILS # BLD: 6.48 K/UL (ref 1.7–7)
NEUTS SEG NFR BLD: 69.5 %
NRBC BLD-RTO: 0 %
PDW BLD AUTO: 11.2 FL (ref 9.4–12.3)
PLATELET # BLD AUTO: 317 K/UL (ref 150–369)
POTASSIUM SERPL-SCNC: 4.1 MEQ/L (ref 3.6–5.1)
PROTHROMBIN TIME: 13.1 SEC (ref 12.2–14.5)
RBC # BLD AUTO: 4.71 M/UL (ref 3.93–5.22)
SARS-COV-2 RNA RESP QL NAA+PROBE: NEGATIVE
SODIUM SERPL-SCNC: 141 MEQ/L (ref 136–144)
WBC # BLD AUTO: 9.33 K/UL (ref 3.8–10.5)

## 2021-04-26 PROCEDURE — 80048 BASIC METABOLIC PNL TOTAL CA: CPT

## 2021-04-26 PROCEDURE — 36415 COLL VENOUS BLD VENIPUNCTURE: CPT

## 2021-04-26 PROCEDURE — 85025 COMPLETE CBC W/AUTO DIFF WBC: CPT

## 2021-04-26 PROCEDURE — 85610 PROTHROMBIN TIME: CPT

## 2021-04-26 PROCEDURE — U0003 INFECTIOUS AGENT DETECTION BY NUCLEIC ACID (DNA OR RNA); SEVERE ACUTE RESPIRATORY SYNDROME CORONAVIRUS 2 (SARS-COV-2) (CORONAVIRUS DISEASE [COVID-19]), AMPLIFIED PROBE TECHNIQUE, MAKING USE OF HIGH THROUGHPUT TECHNOLOGIES AS DESCRIBED BY CMS-2020-01-R: HCPCS

## 2021-04-30 ENCOUNTER — HOSPITAL ENCOUNTER (OUTPATIENT)
Facility: HOSPITAL | Age: 71
Setting detail: HOSPITAL OUTPATIENT SURGERY
Discharge: HOME | End: 2021-04-30
Attending: INTERNAL MEDICINE | Admitting: INTERNAL MEDICINE
Payer: COMMERCIAL

## 2021-04-30 VITALS
WEIGHT: 198 LBS | TEMPERATURE: 98.1 F | BODY MASS INDEX: 31.82 KG/M2 | OXYGEN SATURATION: 97 % | HEART RATE: 60 BPM | DIASTOLIC BLOOD PRESSURE: 87 MMHG | SYSTOLIC BLOOD PRESSURE: 184 MMHG | RESPIRATION RATE: 18 BRPM | HEIGHT: 66 IN

## 2021-04-30 DIAGNOSIS — R06.02 SHORTNESS OF BREATH: ICD-10-CM

## 2021-04-30 DIAGNOSIS — R94.39 ABNORMAL STRESS TEST: ICD-10-CM

## 2021-04-30 LAB
POCT OXYHGB: 74 % (ref 93–98)
POCT OXYHGB: 75.5 % (ref 93–98)
POCT OXYHGB: 76 % (ref 93–98)
POCT OXYHGB: 76.2 % (ref 93–98)
POCT TEST: ABNORMAL

## 2021-04-30 PROCEDURE — B2111ZZ FLUOROSCOPY OF MULTIPLE CORONARY ARTERIES USING LOW OSMOLAR CONTRAST: ICD-10-PCS | Performed by: INTERNAL MEDICINE

## 2021-04-30 PROCEDURE — 99152 MOD SED SAME PHYS/QHP 5/>YRS: CPT | Performed by: INTERNAL MEDICINE

## 2021-04-30 PROCEDURE — C1769 GUIDE WIRE: HCPCS | Performed by: INTERNAL MEDICINE

## 2021-04-30 PROCEDURE — 99153 MOD SED SAME PHYS/QHP EA: CPT | Performed by: INTERNAL MEDICINE

## 2021-04-30 PROCEDURE — 63700000 HC SELF-ADMINISTRABLE DRUG: Performed by: INTERNAL MEDICINE

## 2021-04-30 PROCEDURE — 25000000 HC PHARMACY GENERAL: Performed by: INTERNAL MEDICINE

## 2021-04-30 PROCEDURE — C1894 INTRO/SHEATH, NON-LASER: HCPCS | Performed by: INTERNAL MEDICINE

## 2021-04-30 PROCEDURE — 27200000 HC STERILE SUPPLY: Performed by: INTERNAL MEDICINE

## 2021-04-30 PROCEDURE — 71000001 HC PACU PHASE 1 INITIAL 30MIN: Performed by: INTERNAL MEDICINE

## 2021-04-30 PROCEDURE — 93456 R HRT CORONARY ARTERY ANGIO: CPT | Mod: 26 | Performed by: INTERNAL MEDICINE

## 2021-04-30 PROCEDURE — 63600000 HC DRUGS/DETAIL CODE: Performed by: INTERNAL MEDICINE

## 2021-04-30 PROCEDURE — C1751 CATH, INF, PER/CENT/MIDLINE: HCPCS | Performed by: INTERNAL MEDICINE

## 2021-04-30 PROCEDURE — 4A023N6 MEASUREMENT OF CARDIAC SAMPLING AND PRESSURE, RIGHT HEART, PERCUTANEOUS APPROACH: ICD-10-PCS | Performed by: INTERNAL MEDICINE

## 2021-04-30 PROCEDURE — 93456 R HRT CORONARY ARTERY ANGIO: CPT | Performed by: INTERNAL MEDICINE

## 2021-04-30 PROCEDURE — 71000011 HC PACU PHASE 1 EA ADDL MIN: Performed by: INTERNAL MEDICINE

## 2021-04-30 PROCEDURE — 63600105 HC IODINE BASED CONTRAST: Mod: JW | Performed by: INTERNAL MEDICINE

## 2021-04-30 RX ORDER — HEPARIN SODIUM 1000 [USP'U]/ML
INJECTION, SOLUTION INTRAVENOUS; SUBCUTANEOUS AS NEEDED
Status: DISCONTINUED | OUTPATIENT
Start: 2021-04-30 | End: 2021-04-30 | Stop reason: HOSPADM

## 2021-04-30 RX ORDER — ASPIRIN 325 MG
325 TABLET ORAL ONCE
Status: COMPLETED | OUTPATIENT
Start: 2021-04-30 | End: 2021-04-30

## 2021-04-30 RX ORDER — LIDOCAINE HYDROCHLORIDE 10 MG/ML
INJECTION, SOLUTION INFILTRATION; PERINEURAL AS NEEDED
Status: DISCONTINUED | OUTPATIENT
Start: 2021-04-30 | End: 2021-04-30 | Stop reason: HOSPADM

## 2021-04-30 RX ORDER — IODIXANOL 320 MG/ML
INJECTION, SOLUTION INTRAVASCULAR AS NEEDED
Status: DISCONTINUED | OUTPATIENT
Start: 2021-04-30 | End: 2021-04-30 | Stop reason: HOSPADM

## 2021-04-30 RX ORDER — MIDAZOLAM HYDROCHLORIDE 2 MG/2ML
INJECTION, SOLUTION INTRAMUSCULAR; INTRAVENOUS AS NEEDED
Status: DISCONTINUED | OUTPATIENT
Start: 2021-04-30 | End: 2021-04-30 | Stop reason: HOSPADM

## 2021-04-30 RX ORDER — FENTANYL CITRATE 50 UG/ML
INJECTION, SOLUTION INTRAMUSCULAR; INTRAVENOUS AS NEEDED
Status: DISCONTINUED | OUTPATIENT
Start: 2021-04-30 | End: 2021-04-30 | Stop reason: HOSPADM

## 2021-04-30 RX ADMIN — ASPIRIN 325 MG: 325 TABLET ORAL at 09:03

## 2021-04-30 NOTE — POST-PROCEDURE NOTE
Discharge instructions reviewed with patient and all questions answered.  To get her up to ambulate once she is done her lunch.  Then to get her dressed and remove her med lock and await her coming to pick her up 1/2 hour after band removed so can monitor the site.

## 2021-04-30 NOTE — Clinical Note
Closure device placed for the vein. Closure device:. Closure pressure manually applied. Hemostasis achieved. Covered with gauze and tegaderm

## 2021-04-30 NOTE — Clinical Note
Heart rate in the 150 range again. Writer at bedside. Pt. Not symptomatic. Stat team called. ICU RN at bedside. Pt. Instructed to perform valsalva/or coughing. Pt. Coughed; heart rate unchanged. Heart rate back down to 87 after about 15 seconds after coughing. Mg and K+ levels ordered. 12 lead EKG performed; unchanged from the previous EKG, except pt. Not in 1st degree AV block Orders received in epic from Dr. Ibarra to administer 25mg metoprolol PO. PRN potassium orders received.    PA sat sent for Serg cardiac output and index calculations.

## 2021-04-30 NOTE — DISCHARGE INSTRUCTIONS
· Post cardiac catheterization instructions:  · NO driving or operating heavy machinery for 24 hours.  This is because of sedation you received for your procedure.  · On day of discharge, limit activities.  · No heavy lifting greater than 10 lbs for the next 2 days after procedure.    · Remove dressing next day. Keep site clean and dry.  · May shower next day of procedure. Do not submerge catherization site in pool or tub baths for 5 days  · Call if  swelling, bleeding, fever or drainage from catheterization site          Medications:  Please take medications as directed. Any questions or concerns, please contact your physician's  office.    Follow up: Dr. Amy Morrison  [phone]

## 2021-04-30 NOTE — Clinical Note
Closure device placed for the left radial artery. Closure device used: radial compression system. Closure pressure manually applied. Hemostasis achieved.

## 2021-09-13 RX ORDER — NEBIVOLOL HYDROCHLORIDE 20 MG/1
TABLET ORAL
Qty: 90 TABLET | Refills: 3 | Status: SHIPPED | OUTPATIENT
Start: 2021-09-13 | End: 2021-10-15

## 2021-10-15 ENCOUNTER — DOCUMENTATION (OUTPATIENT)
Dept: CARDIOLOGY | Facility: CLINIC | Age: 71
End: 2021-10-15

## 2021-10-15 ENCOUNTER — OFFICE VISIT (OUTPATIENT)
Dept: CARDIOLOGY | Facility: CLINIC | Age: 71
End: 2021-10-15
Payer: COMMERCIAL

## 2021-10-15 VITALS
DIASTOLIC BLOOD PRESSURE: 66 MMHG | BODY MASS INDEX: 32.62 KG/M2 | HEIGHT: 66 IN | HEART RATE: 54 BPM | OXYGEN SATURATION: 98 % | SYSTOLIC BLOOD PRESSURE: 128 MMHG | WEIGHT: 203 LBS

## 2021-10-15 DIAGNOSIS — I10 ESSENTIAL HYPERTENSION: Primary | ICD-10-CM

## 2021-10-15 DIAGNOSIS — R00.1 SINUS BRADYCARDIA: ICD-10-CM

## 2021-10-15 DIAGNOSIS — R06.02 SHORTNESS OF BREATH: ICD-10-CM

## 2021-10-15 DIAGNOSIS — Q21.12 PFO (PATENT FORAMEN OVALE): ICD-10-CM

## 2021-10-15 PROCEDURE — 3078F DIAST BP <80 MM HG: CPT | Performed by: INTERNAL MEDICINE

## 2021-10-15 PROCEDURE — 99214 OFFICE O/P EST MOD 30 MIN: CPT | Performed by: INTERNAL MEDICINE

## 2021-10-15 PROCEDURE — 3074F SYST BP LT 130 MM HG: CPT | Performed by: INTERNAL MEDICINE

## 2021-10-15 PROCEDURE — 3008F BODY MASS INDEX DOCD: CPT | Performed by: INTERNAL MEDICINE

## 2021-10-15 PROCEDURE — 93000 ELECTROCARDIOGRAM COMPLETE: CPT | Performed by: INTERNAL MEDICINE

## 2021-10-15 RX ORDER — AMLODIPINE BESYLATE 5 MG/1
5 TABLET ORAL DAILY
Qty: 30 TABLET | Refills: 5 | Status: SHIPPED | OUTPATIENT
Start: 2021-10-15 | End: 2022-04-06

## 2021-10-15 RX ORDER — HYDROCHLOROTHIAZIDE 12.5 MG/1
12.5 TABLET ORAL 3 TIMES WEEKLY
Qty: 30 TABLET | Refills: 1 | Status: SHIPPED | OUTPATIENT
Start: 2021-10-15 | End: 2022-01-07

## 2021-10-15 RX ORDER — NEBIVOLOL 20 MG/1
20 TABLET ORAL
Qty: 90 TABLET | Refills: 3
Start: 2021-10-15 | End: 2022-09-01

## 2021-10-15 NOTE — LETTER
October 18, 2021     Cris Quiroz MD  [address] [place] [address]  [facility] [id number]    Patient: Joie Sullivan  YOB: 1950  Date of Visit: 10/15/2021      Dear Dr. Quiroz:    Thank you for referring Joie Sullivan to me for evaluation. Below are my notes for this consultation.    If you have questions, please do not hesitate to call me. I look forward to following your patient along with you.         Sincerely,        Amy Montero MD        CC: No Recipients  Amy Ken MD  10/18/2021  8:08 AM  Signed       Cardiology Office Visit     Patient ID: Joie Sullivan 70 y.o. female 1950  PCP: Cris Quiroz MD   History Present Illness     Joie Sullivan returns to the office today for follow-up of her difficult to control hypertension, sinus bradycardia, dyspnea on exertion and recently an abnormal stress test. Her echo showed hyperdynamic LV function with evidence of elevated filling pressures.  There is possibly a small PFO by color Doppler, but agitated saline contrast was not administered.  Her stress test did not show any pharmacologic EKG changes or arrhythmia, but there was a moderate sized anterior defect with at least partial reversibility.  Although this could be soft tissue/breast attenuation, findings were concerning for ischemia in the territory of the LAD.  LV function was preserved.  Given her risk factors, we decided to proceed with cardiac catheterization.    Cardiac catheterization was performed on 4/30/2021 and demonstrated no significant coronary artery disease.  I had also requested a right heart catheterization with saturation run to rule out any shunt given the appearance of PFO on echocardiogram.  There was no evidence of left to right shunting.  Cardiac index was 3.5 L/min/m².  Filling pressures were mildly elevated with PA 49/14 and mean of 29 and PCWP of 18 with a V wave 35 mmHg.  We reviewed these results again today.    The patient  "states that she does tend to get lower extremity edema but she is on 10 mg of amlodipine.  She has noticed this a bit more recently.  She denies PND or orthopnea.  The significant dyspnea on exertion that was the original reason she started seeing us has resolved with better blood pressure control.  She has not had any recent blood work but is planning to have a visit shortly.    Allergies/Medications   Allergies:Patient has no known allergies.    Medications:  Current Outpatient Medications   Medication Instructions   • amLODIPine (NORVASC) 5 mg, oral, Daily   • hydrochlorothiazide (HYDRODIURIL) 12.5 mg, oral, 3 times weekly   • losartan (COZAAR) 100 mg, oral, Daily (6a)   • nebivoloL (BYSTOLIC) 20 mg, oral, Daily (6a)      Physical Exam     Vitals:    10/15/21 1136 10/15/21 1159   BP: 120/70 128/66   BP Location: Right upper arm Left upper arm   Patient Position: Sitting    Pulse: (!) 54    SpO2: 98%    Weight: 92.1 kg (203 lb)    Height: 1.676 m (5' 6\")      BP Readings from Last 3 Encounters:   10/15/21 128/66   04/30/21 (!) 184/87   04/09/21 140/66     Wt Readings from Last 3 Encounters:   10/15/21 92.1 kg (203 lb)   04/30/21 89.8 kg (198 lb)   04/09/21 91.9 kg (202 lb 9.6 oz)      Physical Exam:  Constitutional: Well developed.  Overweight.  No apparent distress  Eyes: No icterus  ENT:  Mucosa moist  Neck: Supple, no JVD or hepatojugular reflux.  No bruits.  Cardiac: Normal S1 and S2, regular rate and rhythm, no S3.  No rub.  No ectopy.  There is a 1/6 systolic murmur at the left sternal border.  Lungs: Clear to auscultation bilaterally.  No wheezing.  Abdomen: Soft, nontender, positive normoactive bowel sounds.  No bruit.  Extremities: No clubbing or cyanosis.  No calf tenderness.  No edema.  Distal pulses are intact.  Skin: Warm and dry.  No jaundice.  Musculoskeletal: No joint swelling or erythema.  Neurologic: Awake, alert, oriented.  Moving all extremities.  Psychiatric: No " agitation.    Labs/Imaging/Procedures   Labs  No recent lab work available.    Imaging  Cardiac catheterization 4/30/2021: No significant coronary artery disease.  No evidence of left to right shunt on right heart catheterization.  Cardiac index 3.5 L/min/m².  Mildly elevated filling pressures with RA 7, PA 49/14 with mean 29, PCWP 18 mmHg and a V wave up to 35 mmHg.     Pharmacologic MPI 4/5/2021: Negative pharmacologic EKG.  LVEF 70%.  Moderate-sized anterior defect with some reversibility suggestive of LAD ischemia.    Echo 4/5/2021: TDS.  Normal LV size.  Top normal wall thickness.  LVEF 65-70% with grossly normal wall motion.  Normal RV size and function.  Mild TR.  PASP 40-45 mmHg.  Moderately dilated left atrium.  Inflow suggestive of elevated filling pressures.  Probable PFO by color Doppler-no agitated saline was administered.  Trileaflet sclerotic aortic valve.  No aortic stenosis.  Trace AI.  Mild MR.  No significant pericardial effusion.  Compared to prior study October 2019, pulmonary pressures have increased.    EKG  Assessment/Plan     1. Essential hypertension  Continue amlodipine but decrease dose from 10 mg to 5 mg daily given her edema.  I am adding in a low-dose of hydrochlorothiazide 12.5 mg 3 times a week.  She has never taken diuretics and is concerned that she will have a very vigorous response, so we will start on the lower side and can always increase.  Her filling pressures were elevated at catheterization and her pulmonary pressures correlated well between echo and right heart catheterization.  She has only mild mitral regurgitation by catheterization but she did have a V wave at cath and I think diuretic would be helpful.  She will contact me if there is any problem with the medication change and anticipates seeing you shortly for follow-up on her blood pressure.    2. Sinus bradycardia  Asymptomatic sinus bradycardia.  No additional intervention.    3. Shortness of breath  Shortness of  breath seems to be at baseline, but she admits that she has not been engaged in much exercise.  She would benefit from increased physical activity and weight loss.  She also has some diastolic dysfunction and we will add diuretic as noted.    4. Abnormal stress test  We reviewed the stress test results in detail as well as her catheterization and echocardiogram.  Continue medical therapy.  No significant obstructive disease.    Return in about 6 months (around 4/15/2022) for follow-up, earlier if any change in symptoms.  Available medical records reviewed and updated including laboratory data, imaging studies, previous outpatient and inpatient records.  Counseling/education performed. Care everywhere utilized where appropriate.  I spent 40 minutes on the date of service performing the specified activities.  Thank you for allowing me to participate in the care of this patient.  I hope this information is helpful.  Social distancing, masking, handwashing and careful monitoring for any COVID symptoms discussed with patient.      Amy Montero MD [facility], [facility]  10/18/2021  8:02 AM  This document was generated utilizing voice recognition technology. Please excuse any typographical errors which may be present.

## 2021-10-18 NOTE — PROGRESS NOTES
Cardiology Office Visit     Patient ID: Joie Sullivan 70 y.o. female 1950  PCP: Cris Quiroz MD   History Present Illness     Joie Sullivan returns to the office today for follow-up of her difficult to control hypertension, sinus bradycardia, dyspnea on exertion and recently an abnormal stress test. Her echo showed hyperdynamic LV function with evidence of elevated filling pressures.  There is possibly a small PFO by color Doppler, but agitated saline contrast was not administered.  Her stress test did not show any pharmacologic EKG changes or arrhythmia, but there was a moderate sized anterior defect with at least partial reversibility.  Although this could be soft tissue/breast attenuation, findings were concerning for ischemia in the territory of the LAD.  LV function was preserved.  Given her risk factors, we decided to proceed with cardiac catheterization.    Cardiac catheterization was performed on 4/30/2021 and demonstrated no significant coronary artery disease.  I had also requested a right heart catheterization with saturation run to rule out any shunt given the appearance of PFO on echocardiogram.  There was no evidence of left to right shunting.  Cardiac index was 3.5 L/min/m².  Filling pressures were mildly elevated with PA 49/14 and mean of 29 and PCWP of 18 with a V wave 35 mmHg.  We reviewed these results again today.    The patient states that she does tend to get lower extremity edema but she is on 10 mg of amlodipine.  She has noticed this a bit more recently.  She denies PND or orthopnea.  The significant dyspnea on exertion that was the original reason she started seeing us has resolved with better blood pressure control.  She has not had any recent blood work but is planning to have a visit shortly.    Allergies/Medications   Allergies:Patient has no known allergies.    Medications:  Current Outpatient Medications   Medication Instructions   • amLODIPine (NORVASC) 5 mg,  "oral, Daily   • hydrochlorothiazide (HYDRODIURIL) 12.5 mg, oral, 3 times weekly   • losartan (COZAAR) 100 mg, oral, Daily (6a)   • nebivoloL (BYSTOLIC) 20 mg, oral, Daily (6a)      Physical Exam     Vitals:    10/15/21 1136 10/15/21 1159   BP: 120/70 128/66   BP Location: Right upper arm Left upper arm   Patient Position: Sitting    Pulse: (!) 54    SpO2: 98%    Weight: 92.1 kg (203 lb)    Height: 1.676 m (5' 6\")      BP Readings from Last 3 Encounters:   10/15/21 128/66   04/30/21 (!) 184/87   04/09/21 140/66     Wt Readings from Last 3 Encounters:   10/15/21 92.1 kg (203 lb)   04/30/21 89.8 kg (198 lb)   04/09/21 91.9 kg (202 lb 9.6 oz)      Physical Exam:  Constitutional: Well developed.  Overweight.  No apparent distress  Eyes: No icterus  ENT:  Mucosa moist  Neck: Supple, no JVD or hepatojugular reflux.  No bruits.  Cardiac: Normal S1 and S2, regular rate and rhythm, no S3.  No rub.  No ectopy.  There is a 1/6 systolic murmur at the left sternal border.  Lungs: Clear to auscultation bilaterally.  No wheezing.  Abdomen: Soft, nontender, positive normoactive bowel sounds.  No bruit.  Extremities: No clubbing or cyanosis.  No calf tenderness.  No edema.  Distal pulses are intact.  Skin: Warm and dry.  No jaundice.  Musculoskeletal: No joint swelling or erythema.  Neurologic: Awake, alert, oriented.  Moving all extremities.  Psychiatric: No agitation.    Labs/Imaging/Procedures   Labs  No recent lab work available.    Imaging  Cardiac catheterization 4/30/2021: No significant coronary artery disease.  No evidence of left to right shunt on right heart catheterization.  Cardiac index 3.5 L/min/m².  Mildly elevated filling pressures with RA 7, PA 49/14 with mean 29, PCWP 18 mmHg and a V wave up to 35 mmHg.     Pharmacologic MPI 4/5/2021: Negative pharmacologic EKG.  LVEF 70%.  Moderate-sized anterior defect with some reversibility suggestive of LAD ischemia.    Echo 4/5/2021: TDS.  Normal LV size.  Top normal wall " thickness.  LVEF 65-70% with grossly normal wall motion.  Normal RV size and function.  Mild TR.  PASP 40-45 mmHg.  Moderately dilated left atrium.  Inflow suggestive of elevated filling pressures.  Probable PFO by color Doppler-no agitated saline was administered.  Trileaflet sclerotic aortic valve.  No aortic stenosis.  Trace AI.  Mild MR.  No significant pericardial effusion.  Compared to prior study October 2019, pulmonary pressures have increased.    EKG  Assessment/Plan     1. Essential hypertension  Continue amlodipine but decrease dose from 10 mg to 5 mg daily given her edema.  I am adding in a low-dose of hydrochlorothiazide 12.5 mg 3 times a week.  She has never taken diuretics and is concerned that she will have a very vigorous response, so we will start on the lower side and can always increase.  Her filling pressures were elevated at catheterization and her pulmonary pressures correlated well between echo and right heart catheterization.  She has only mild mitral regurgitation by catheterization but she did have a V wave at cath and I think diuretic would be helpful.  She will contact me if there is any problem with the medication change and anticipates seeing you shortly for follow-up on her blood pressure.    2. Sinus bradycardia  Asymptomatic sinus bradycardia.  No additional intervention.    3. Shortness of breath  Shortness of breath seems to be at baseline, but she admits that she has not been engaged in much exercise.  She would benefit from increased physical activity and weight loss.  She also has some diastolic dysfunction and we will add diuretic as noted.    4. Abnormal stress test  We reviewed the stress test results in detail as well as her catheterization and echocardiogram.  Continue medical therapy.  No significant obstructive disease.    Return in about 6 months (around 4/15/2022) for follow-up, earlier if any change in symptoms.  Available medical records reviewed and updated  including laboratory data, imaging studies, previous outpatient and inpatient records.  Counseling/education performed. Care everywhere utilized where appropriate.  I spent 40 minutes on the date of service performing the specified activities.  Thank you for allowing me to participate in the care of this patient.  I hope this information is helpful.  Social distancing, masking, handwashing and careful monitoring for any COVID symptoms discussed with patient.      Amy Montero MD [facility], [facility]  10/18/2021  8:02 AM  This document was generated utilizing voice recognition technology. Please excuse any typographical errors which may be present.

## 2022-01-07 RX ORDER — HYDROCHLOROTHIAZIDE 12.5 MG/1
12.5 TABLET ORAL 3 TIMES WEEKLY
Qty: 36 TABLET | Refills: 1 | Status: SHIPPED | OUTPATIENT
Start: 2022-01-07 | End: 2022-07-06

## 2022-02-14 ENCOUNTER — TELEPHONE (OUTPATIENT)
Dept: CARDIOLOGY | Facility: CLINIC | Age: 72
End: 2022-02-14
Payer: COMMERCIAL

## 2022-02-14 NOTE — TELEPHONE ENCOUNTER
APPT 4/15/22    LMOM for patient to call me back and re-schedule her appt, provider will not be in that day.    CALEB

## 2022-04-06 RX ORDER — AMLODIPINE BESYLATE 5 MG/1
TABLET ORAL
Qty: 90 TABLET | Refills: 1 | Status: SHIPPED | OUTPATIENT
Start: 2022-04-06 | End: 2022-04-21

## 2022-04-12 ENCOUNTER — APPOINTMENT (RX ONLY)
Dept: URBAN - METROPOLITAN AREA CLINIC 23 | Facility: CLINIC | Age: 72
Setting detail: DERMATOLOGY
End: 2022-04-12

## 2022-04-12 DIAGNOSIS — L259 CONTACT DERMATITIS AND OTHER ECZEMA, UNSPECIFIED CAUSE: ICD-10-CM

## 2022-04-12 PROBLEM — L23.9 ALLERGIC CONTACT DERMATITIS, UNSPECIFIED CAUSE: Status: ACTIVE | Noted: 2022-04-12

## 2022-04-12 PROCEDURE — ? COUNSELING

## 2022-04-12 PROCEDURE — 99203 OFFICE O/P NEW LOW 30 MIN: CPT

## 2022-04-12 PROCEDURE — ? PRESCRIPTION MEDICATION MANAGEMENT

## 2022-04-12 PROCEDURE — ? PRESCRIPTION

## 2022-04-12 PROCEDURE — ? TREATMENT REGIMEN

## 2022-04-12 PROCEDURE — ? ADDITIONAL NOTES

## 2022-04-12 RX ORDER — HYDROCORTISONE 25 MG/G
OINTMENT TOPICAL BID
Qty: 28.35 | Refills: 1 | Status: ERX | COMMUNITY
Start: 2022-04-12

## 2022-04-12 RX ADMIN — HYDROCORTISONE: 25 OINTMENT TOPICAL at 00:00

## 2022-04-12 ASSESSMENT — LOCATION DETAILED DESCRIPTION DERM
LOCATION DETAILED: LEFT LATERAL SUPERIOR EYELID
LOCATION DETAILED: RIGHT CENTRAL LATERAL NECK
LOCATION DETAILED: RIGHT LATERAL SUPERIOR EYELID
LOCATION DETAILED: LEFT MEDIAL ZYGOMA

## 2022-04-12 ASSESSMENT — LOCATION ZONE DERM
LOCATION ZONE: EYELID
LOCATION ZONE: FACE
LOCATION ZONE: NECK

## 2022-04-12 ASSESSMENT — LOCATION SIMPLE DESCRIPTION DERM
LOCATION SIMPLE: LEFT SUPERIOR EYELID
LOCATION SIMPLE: LEFT ZYGOMA
LOCATION SIMPLE: NECK
LOCATION SIMPLE: RIGHT SUPERIOR EYELID

## 2022-04-12 NOTE — HPI: RASH
What Type Of Note Output Would You Prefer (Optional)?: Standard Output
How Severe Is Your Rash?: mild
Is This A New Presentation, Or A Follow-Up?: Rash
Additional History: Patient states it been there for a month but in different forms.

## 2022-04-12 NOTE — PROCEDURE: PRESCRIPTION MEDICATION MANAGEMENT
Render In Strict Bullet Format?: No
Detail Level: Zone
Initiate Treatment: hydrocortisone 2.5% topical ointment: Apply to affected areas of face and neck twice daily x 1-2 weeks until clear.

## 2022-04-12 NOTE — PROCEDURE: MIPS QUALITY
Quality 111:Pneumonia Vaccination Status For Older Adults: Pneumococcal Vaccination Previously Received
Quality 130: Documentation Of Current Medications In The Medical Record: Current Medications Documented
Quality 47: Advance Care Plan: Advance Care Planning discussed and documented in the medical record; patient did not wish or was not able to name a surrogate decision maker or provide an advance care plan.
Quality 431: Preventive Care And Screening: Unhealthy Alcohol Use - Screening: Patient not identified as an unhealthy alcohol user when screened for unhealthy alcohol use using a systematic screening method
Quality 226: Preventive Care And Screening: Tobacco Use: Screening And Cessation Intervention: Patient screened for tobacco use and is an ex/non-smoker
Detail Level: Detailed

## 2022-04-12 NOTE — PROCEDURE: TREATMENT REGIMEN
Discontinue Regimen: Philosophy skin care products
Detail Level: Simple
Plan: Switch to Vanicream products
Samples Given: Vanicream Gentle Facial Cleanser and Moisturizer

## 2022-04-21 ENCOUNTER — OFFICE VISIT (OUTPATIENT)
Dept: CARDIOLOGY | Facility: CLINIC | Age: 72
End: 2022-04-21
Payer: COMMERCIAL

## 2022-04-21 VITALS
DIASTOLIC BLOOD PRESSURE: 70 MMHG | SYSTOLIC BLOOD PRESSURE: 140 MMHG | BODY MASS INDEX: 32.3 KG/M2 | WEIGHT: 201 LBS | HEART RATE: 54 BPM | OXYGEN SATURATION: 98 % | HEIGHT: 66 IN

## 2022-04-21 DIAGNOSIS — I10 ESSENTIAL HYPERTENSION: Primary | ICD-10-CM

## 2022-04-21 DIAGNOSIS — R94.39 ABNORMAL STRESS TEST: ICD-10-CM

## 2022-04-21 DIAGNOSIS — R00.1 SINUS BRADYCARDIA: ICD-10-CM

## 2022-04-21 DIAGNOSIS — R06.02 SHORTNESS OF BREATH: ICD-10-CM

## 2022-04-21 PROCEDURE — 3008F BODY MASS INDEX DOCD: CPT | Performed by: INTERNAL MEDICINE

## 2022-04-21 PROCEDURE — 93000 ELECTROCARDIOGRAM COMPLETE: CPT | Performed by: INTERNAL MEDICINE

## 2022-04-21 PROCEDURE — 3078F DIAST BP <80 MM HG: CPT | Performed by: INTERNAL MEDICINE

## 2022-04-21 PROCEDURE — 3077F SYST BP >= 140 MM HG: CPT | Performed by: INTERNAL MEDICINE

## 2022-04-21 PROCEDURE — 99214 OFFICE O/P EST MOD 30 MIN: CPT | Performed by: INTERNAL MEDICINE

## 2022-04-21 RX ORDER — AMOXICILLIN AND CLAVULANATE POTASSIUM 875; 125 MG/1; MG/1
1 TABLET, FILM COATED ORAL
COMMUNITY
Start: 2022-04-11 | End: 2023-02-16

## 2022-04-21 RX ORDER — AMLODIPINE BESYLATE 5 MG/1
5 TABLET ORAL
Qty: 90 TABLET | Refills: 1
Start: 2022-04-21 | End: 2022-07-18 | Stop reason: SDUPTHER

## 2022-04-21 RX ORDER — LOSARTAN POTASSIUM 100 MG/1
100 TABLET ORAL
Qty: 90 TABLET | Refills: 3
Start: 2022-04-21 | End: 2022-07-18 | Stop reason: SDUPTHER

## 2022-04-21 NOTE — LETTER
April 26, 2022     Cris Quiroz MD  [address] [facility] [address]  [facility] [address]    Patient: Joie Sullivan  YOB: 1950  Date of Visit: 4/21/2022      Dear Dr. Quiroz:    Thank you for referring Joie Sullivan to me for evaluation. Below are my notes for this consultation.    If you have questions, please do not hesitate to call me. I look forward to following your patient along with you.         Sincerely,        Amy Montero MD        CC: No Recipients  Amy Ken MD  4/26/2022  8:03 AM  Signed       Cardiology Office Visit     Patient ID: Joie Sullivan 71 y.o. female 1950  PCP: Cris Quiroz MD   History Present Illness     Joie Sullivan returns to the office today for follow-up of her difficult to control hypertension, sinus bradycardia, dyspnea on exertion and recently an abnormal stress test. Her echo showed hyperdynamic LV function with evidence of elevated filling pressures.  There is possibly a small PFO by color Doppler, but agitated saline contrast was not administered.  Her stress test did not show any pharmacologic EKG changes or arrhythmia, but there was a moderate sized anterior defect with at least partial reversibility.  Although this could be soft tissue/breast attenuation, findings were concerning for ischemia in the territory of the LAD.  LV function was preserved.  Given her risk factors, we decided to proceed with cardiac catheterization.    Cardiac catheterization was performed on 4/30/2021 and demonstrated no significant coronary artery disease.  I had also requested a right heart catheterization with saturation run to rule out any shunt given the appearance of PFO on echocardiogram. There was no evidence of left to right shunting.  Cardiac index was 3.5 L/min/m².  Filling pressures were mildly elevated with PA 49/14 and mean of 29 and PCWP of 18 with a V wave 35 mmHg.  We reviewed these results again today.    Today she states that  "she has been feeling well and she thinks that her dyspnea on exertion is related to \"times that she does not sleep well the night before\".  She admits that she has a tendency to stay up too late because her son is a color commentator for sports and she likes to stay up and listen to the games.  The following day, she says she is a bit more dyspneic walking, but other days she will feel well.  She has been making more of an effort to walk around campus to try to lose weight.  She denies any chest pain.    Allergies/Medications   Allergies:Patient has no known allergies.    Medications:  Current Outpatient Medications   Medication Instructions   • amLODIPine (NORVASC) 5 mg, oral, Daily (6a)   • amoxicillin-pot clavulanate (AUGMENTIN) 875-125 mg per tablet 1 tablet, oral, 2 times daily (6a, 6p)   • hydrochlorothiazide (HYDRODIURIL) 12.5 mg, oral, 3 times weekly   • losartan (COZAAR) 100 mg, oral, Daily (6a)   • nebivoloL (BYSTOLIC) 20 mg, oral, Daily (6a)      Physical Exam     Vitals:    04/21/22 0830   BP: 140/70   BP Location: Right upper arm   Patient Position: Sitting   Pulse: (!) 54   SpO2: 98%   Weight: 91.2 kg (201 lb)   Height: 1.676 m (5' 6\")     BP Readings from Last 3 Encounters:   04/21/22 140/70   10/15/21 128/66   04/30/21 (!) 184/87     Wt Readings from Last 3 Encounters:   04/21/22 91.2 kg (201 lb)   10/15/21 92.1 kg (203 lb)   04/30/21 89.8 kg (198 lb)      Physical Exam:  Constitutional: Well developed.  Overweight.  No apparent distress  Eyes: No icterus  ENT:  Mucosa moist  Neck: Supple, no JVD or hepatojugular reflux.  No bruits.  Cardiac: Normal S1 and S2, regular rate and rhythm, no S3.  No rub.  No ectopy.  There is a 1/6 systolic murmur at the left sternal border.  Lungs: Clear to auscultation bilaterally.  No wheezing.  Abdomen: Soft, nontender, positive normoactive bowel sounds.  No bruit.  Extremities: No clubbing or cyanosis.  No calf tenderness.  No edema.  Distal pulses are " intact.  Skin: Warm and dry.  No jaundice.  Musculoskeletal: No joint swelling or erythema.  Neurologic: Awake, alert, oriented.  Moving all extremities.  Psychiatric: No agitation.    Labs/Imaging/Procedures   Labs  4/26/2021: Sodium 141, potassium 4.1, chloride 102, CO2 27, BUN 12, creatinine 0.6, glucose 99.  CBC unremarkable.    Imaging  Cardiac catheterization 4/30/2021: No significant coronary artery disease.  No evidence of left to right shunt on right heart catheterization.  Cardiac index 3.5 L/min/m².  Mildly elevated filling pressures with RA 7, PA 49/14 with mean 29, PCWP 18 mmHg and a V wave up to 35 mmHg.     Pharmacologic MPI 4/5/2021: Negative pharmacologic EKG.  LVEF 70%.  Moderate-sized anterior defect with some reversibility suggestive of LAD ischemia.    Echo 4/5/2021: TDS.  Normal LV size.  Top normal wall thickness.  LVEF 65-70% with grossly normal wall motion.  Normal RV size and function.  Mild TR.  PASP 40-45 mmHg.  Moderately dilated left atrium.  Inflow suggestive of elevated filling pressures.  Probable PFO by color Doppler-no agitated saline was administered.  Trileaflet sclerotic aortic valve.  No aortic stenosis.  Trace AI.  Mild MR.  No significant pericardial effusion.  Compared to prior study October 2019, pulmonary pressures have increased.    EKG  Assessment/Plan     1. Essential hypertension  Continue amlodipine 5 mg daily with losartan 100 mg daily and nebivolol 20 mg daily.  She is taking hydrochlorothiazide 3 times a week and is tolerating this well-she was concerned about taking this on a daily basis.  I reminded her of the need to follow-up with you regarding lab work.    2. Sinus bradycardia  Asymptomatic sinus bradycardia.  No additional intervention.    3. Shortness of breath  Shortness of breath seems to be at baseline, aside from the pattern she notices after lack of sleep.  I encouraged her to continue with exercise and weight loss since there is an element of  deconditioning.  Her blood pressure is well controlled.    4. Abnormal stress test  We reviewed the stress test results in detail as well as her catheterization and echocardiogram.  Continue medical therapy.  No significant obstructive disease.    Return in about 9 months (around 1/21/2023) for follow-up, earlier if any change in symptoms.  Available medical records reviewed and updated including laboratory data, imaging studies, previous outpatient and inpatient records.  Counseling/education performed. Care everywhere utilized where appropriate.  I spent 40 minutes on the date of service performing the specified activities.  Thank you for allowing me to participate in the care of this patient.  I hope this information is helpful.  Social distancing, masking, handwashing and careful monitoring for any COVID symptoms discussed with patient.      Amy Montero MD [facility]  4/26/2022  7:58 AM  This document was generated utilizing voice recognition technology. Please excuse any typographical errors which may be present.

## 2022-04-26 NOTE — PROGRESS NOTES
"     Cardiology Office Visit     Patient ID: Joie Sullivan 71 y.o. female 1950  PCP: Cris Quiroz MD   History Present Illness     Joie Sullivan returns to the office today for follow-up of her difficult to control hypertension, sinus bradycardia, dyspnea on exertion and recently an abnormal stress test. Her echo showed hyperdynamic LV function with evidence of elevated filling pressures.  There is possibly a small PFO by color Doppler, but agitated saline contrast was not administered.  Her stress test did not show any pharmacologic EKG changes or arrhythmia, but there was a moderate sized anterior defect with at least partial reversibility.  Although this could be soft tissue/breast attenuation, findings were concerning for ischemia in the territory of the LAD.  LV function was preserved.  Given her risk factors, we decided to proceed with cardiac catheterization.    Cardiac catheterization was performed on 4/30/2021 and demonstrated no significant coronary artery disease.  I had also requested a right heart catheterization with saturation run to rule out any shunt given the appearance of PFO on echocardiogram. There was no evidence of left to right shunting.  Cardiac index was 3.5 L/min/m².  Filling pressures were mildly elevated with PA 49/14 and mean of 29 and PCWP of 18 with a V wave 35 mmHg.  We reviewed these results again today.    Today she states that she has been feeling well and she thinks that her dyspnea on exertion is related to \"times that she does not sleep well the night before\".  She admits that she has a tendency to stay up too late because her son is a color commentator for sports and she likes to stay up and listen to the games.  The following day, she says she is a bit more dyspneic walking, but other days she will feel well.  She has been making more of an effort to walk around campus to try to lose weight.  She denies any chest pain.    Allergies/Medications " "  Allergies:Patient has no known allergies.    Medications:  Current Outpatient Medications   Medication Instructions   • amLODIPine (NORVASC) 5 mg, oral, Daily (6a)   • amoxicillin-pot clavulanate (AUGMENTIN) 875-125 mg per tablet 1 tablet, oral, 2 times daily (6a, 6p)   • hydrochlorothiazide (HYDRODIURIL) 12.5 mg, oral, 3 times weekly   • losartan (COZAAR) 100 mg, oral, Daily (6a)   • nebivoloL (BYSTOLIC) 20 mg, oral, Daily (6a)      Physical Exam     Vitals:    04/21/22 0830   BP: 140/70   BP Location: Right upper arm   Patient Position: Sitting   Pulse: (!) 54   SpO2: 98%   Weight: 91.2 kg (201 lb)   Height: 1.676 m (5' 6\")     BP Readings from Last 3 Encounters:   04/21/22 140/70   10/15/21 128/66   04/30/21 (!) 184/87     Wt Readings from Last 3 Encounters:   04/21/22 91.2 kg (201 lb)   10/15/21 92.1 kg (203 lb)   04/30/21 89.8 kg (198 lb)      Physical Exam:  Constitutional: Well developed.  Overweight.  No apparent distress  Eyes: No icterus  ENT:  Mucosa moist  Neck: Supple, no JVD or hepatojugular reflux.  No bruits.  Cardiac: Normal S1 and S2, regular rate and rhythm, no S3.  No rub.  No ectopy.  There is a 1/6 systolic murmur at the left sternal border.  Lungs: Clear to auscultation bilaterally.  No wheezing.  Abdomen: Soft, nontender, positive normoactive bowel sounds.  No bruit.  Extremities: No clubbing or cyanosis.  No calf tenderness.  No edema.  Distal pulses are intact.  Skin: Warm and dry.  No jaundice.  Musculoskeletal: No joint swelling or erythema.  Neurologic: Awake, alert, oriented.  Moving all extremities.  Psychiatric: No agitation.    Labs/Imaging/Procedures   Labs  4/26/2021: Sodium 141, potassium 4.1, chloride 102, CO2 27, BUN 12, creatinine 0.6, glucose 99.  CBC unremarkable.    Imaging  Cardiac catheterization 4/30/2021: No significant coronary artery disease.  No evidence of left to right shunt on right heart catheterization.  Cardiac index 3.5 L/min/m².  Mildly elevated filling " pressures with RA 7, PA 49/14 with mean 29, PCWP 18 mmHg and a V wave up to 35 mmHg.     Pharmacologic MPI 4/5/2021: Negative pharmacologic EKG.  LVEF 70%.  Moderate-sized anterior defect with some reversibility suggestive of LAD ischemia.    Echo 4/5/2021: TDS.  Normal LV size.  Top normal wall thickness.  LVEF 65-70% with grossly normal wall motion.  Normal RV size and function.  Mild TR.  PASP 40-45 mmHg.  Moderately dilated left atrium.  Inflow suggestive of elevated filling pressures.  Probable PFO by color Doppler-no agitated saline was administered.  Trileaflet sclerotic aortic valve.  No aortic stenosis.  Trace AI.  Mild MR.  No significant pericardial effusion.  Compared to prior study October 2019, pulmonary pressures have increased.    EKG  Assessment/Plan     1. Essential hypertension  Continue amlodipine 5 mg daily with losartan 100 mg daily and nebivolol 20 mg daily.  She is taking hydrochlorothiazide 3 times a week and is tolerating this well-she was concerned about taking this on a daily basis.  I reminded her of the need to follow-up with you regarding lab work.    2. Sinus bradycardia  Asymptomatic sinus bradycardia.  No additional intervention.    3. Shortness of breath  Shortness of breath seems to be at baseline, aside from the pattern she notices after lack of sleep.  I encouraged her to continue with exercise and weight loss since there is an element of deconditioning.  Her blood pressure is well controlled.    4. Abnormal stress test  We reviewed the stress test results in detail as well as her catheterization and echocardiogram.  Continue medical therapy.  No significant obstructive disease.    Return in about 9 months (around 1/21/2023) for follow-up, earlier if any change in symptoms.  Available medical records reviewed and updated including laboratory data, imaging studies, previous outpatient and inpatient records.  Counseling/education performed. Care everywhere utilized where  appropriate.  I spent 40 minutes on the date of service performing the specified activities.  Thank you for allowing me to participate in the care of this patient.  I hope this information is helpful.  Social distancing, masking, handwashing and careful monitoring for any COVID symptoms discussed with patient.      Amy Montero MD [facility], [facility]  4/26/2022  7:58 AM  This document was generated utilizing voice recognition technology. Please excuse any typographical errors which may be present.

## 2022-07-06 RX ORDER — HYDROCHLOROTHIAZIDE 12.5 MG/1
12.5 TABLET ORAL 3 TIMES WEEKLY
Qty: 36 TABLET | Refills: 1 | Status: SHIPPED | OUTPATIENT
Start: 2022-07-06 | End: 2022-11-29 | Stop reason: SDUPTHER

## 2022-07-18 ENCOUNTER — TELEPHONE (OUTPATIENT)
Dept: CARDIOLOGY | Facility: CLINIC | Age: 72
End: 2022-07-18
Payer: COMMERCIAL

## 2022-07-18 DIAGNOSIS — I10 ESSENTIAL HYPERTENSION: ICD-10-CM

## 2022-07-18 RX ORDER — LOSARTAN POTASSIUM 100 MG/1
100 TABLET ORAL
Qty: 90 TABLET | Refills: 3 | Status: SHIPPED | OUTPATIENT
Start: 2022-07-18 | End: 2023-07-22

## 2022-07-18 RX ORDER — AMLODIPINE BESYLATE 5 MG/1
5 TABLET ORAL
Qty: 90 TABLET | Refills: 1 | Status: SHIPPED | OUTPATIENT
Start: 2022-07-18 | End: 2022-07-19 | Stop reason: SDUPTHER

## 2022-07-19 DIAGNOSIS — I10 ESSENTIAL HYPERTENSION: ICD-10-CM

## 2022-07-19 RX ORDER — AMLODIPINE BESYLATE 5 MG/1
5 TABLET ORAL
Qty: 90 TABLET | Refills: 1 | Status: SHIPPED | OUTPATIENT
Start: 2022-07-19 | End: 2023-02-16 | Stop reason: SDUPTHER

## 2022-11-29 DIAGNOSIS — I10 ESSENTIAL HYPERTENSION: ICD-10-CM

## 2022-11-29 RX ORDER — NEBIVOLOL 20 MG/1
20 TABLET ORAL DAILY
Qty: 90 TABLET | Refills: 3 | Status: SHIPPED | OUTPATIENT
Start: 2022-11-29 | End: 2023-03-09 | Stop reason: SDUPTHER

## 2022-11-29 RX ORDER — HYDROCHLOROTHIAZIDE 12.5 MG/1
12.5 TABLET ORAL 3 TIMES WEEKLY
Qty: 39 TABLET | Refills: 3 | Status: SHIPPED | OUTPATIENT
Start: 2022-11-30 | End: 2023-12-28

## 2022-11-29 NOTE — TELEPHONE ENCOUNTER
Medicine Refill Request    Last Office: Visit date not found   Last Consult Visit: Visit date not found  Last Telemedicine Visit: Visit date not found    Next Appointment: Visit date not found    nebivoloL (BYSTOLIC) 20 mg tablet    hydrochlorothiazide (HYDRODIURIL) 12.5 mg tablet    **Pt states that these meds are combined.**      Current Outpatient Medications:     amLODIPine (NORVASC) 5 mg tablet, Take 1 tablet (5 mg total) by mouth once daily., Disp: 90 tablet, Rfl: 1    amoxicillin-pot clavulanate (AUGMENTIN) 875-125 mg per tablet, Take 1 tablet by mouth 2 (two) times a day., Disp: , Rfl:     hydrochlorothiazide (HYDRODIURIL) 12.5 mg tablet, TAKE 1 TABLET (12.5 MG TOTAL) BY MOUTH 3 (THREE) TIMES A WEEK (MON, WED, FRI)., Disp: 36 tablet, Rfl: 1    losartan (COZAAR) 100 mg tablet, Take 1 tablet (100 mg total) by mouth once daily., Disp: 90 tablet, Rfl: 3    nebivoloL (BYSTOLIC) 20 mg tablet, TAKE 1 TABLET DAILY, Disp: 90 tablet, Rfl: 3      BP Readings from Last 3 Encounters:   04/21/22 140/70   10/15/21 128/66   04/30/21 (!) 184/87       Recent Lab results:  No results found for: CHOL, No results found for: HDL, No results found for: LDLCALC, No results found for: TRIG     Lab Results   Component Value Date    GLUCOSE 99 04/26/2021   , No results found for: HGBA1C      Lab Results   Component Value Date    CREATININE 0.6 04/26/2021       No results found for: TSH

## 2023-02-16 ENCOUNTER — OFFICE VISIT (OUTPATIENT)
Dept: CARDIOLOGY | Facility: CLINIC | Age: 73
End: 2023-02-16
Payer: COMMERCIAL

## 2023-02-16 VITALS
WEIGHT: 197.6 LBS | DIASTOLIC BLOOD PRESSURE: 76 MMHG | BODY MASS INDEX: 31.76 KG/M2 | RESPIRATION RATE: 20 BRPM | OXYGEN SATURATION: 99 % | HEART RATE: 54 BPM | SYSTOLIC BLOOD PRESSURE: 170 MMHG | HEIGHT: 66 IN

## 2023-02-16 DIAGNOSIS — R00.1 SINUS BRADYCARDIA: ICD-10-CM

## 2023-02-16 DIAGNOSIS — I10 ESSENTIAL HYPERTENSION: Primary | ICD-10-CM

## 2023-02-16 DIAGNOSIS — R94.39 ABNORMAL STRESS TEST: ICD-10-CM

## 2023-02-16 PROBLEM — E55.9 VITAMIN D DEFICIENCY: Status: ACTIVE | Noted: 2023-02-16

## 2023-02-16 PROBLEM — E66.9 OBESITY: Status: ACTIVE | Noted: 2023-02-16

## 2023-02-16 PROCEDURE — 3077F SYST BP >= 140 MM HG: CPT | Performed by: INTERNAL MEDICINE

## 2023-02-16 PROCEDURE — 93000 ELECTROCARDIOGRAM COMPLETE: CPT | Performed by: INTERNAL MEDICINE

## 2023-02-16 PROCEDURE — 3078F DIAST BP <80 MM HG: CPT | Performed by: INTERNAL MEDICINE

## 2023-02-16 PROCEDURE — 3008F BODY MASS INDEX DOCD: CPT | Performed by: INTERNAL MEDICINE

## 2023-02-16 PROCEDURE — 99214 OFFICE O/P EST MOD 30 MIN: CPT | Performed by: INTERNAL MEDICINE

## 2023-02-16 RX ORDER — VIT C/E/ZN/COPPR/LUTEIN/ZEAXAN 250MG-90MG
1000 CAPSULE ORAL DAILY
COMMUNITY

## 2023-02-16 RX ORDER — AMLODIPINE BESYLATE 5 MG/1
10 TABLET ORAL
Qty: 180 TABLET | Refills: 3 | Status: SHIPPED | OUTPATIENT
Start: 2023-02-16 | End: 2023-06-16

## 2023-02-16 RX ORDER — KETOCONAZOLE 20 MG/G
1 CREAM TOPICAL DAILY
COMMUNITY
Start: 2022-12-09 | End: 2024-07-18

## 2023-02-16 NOTE — LETTER
"February 16, 2023     Cris Quiroz MD  [address] [facility]  [address]  [facility] [address]    Patient: Joie Sullivan  YOB: 1950  Date of Visit: 2/16/2023      Dear Dr. Quiroz:    Thank you for referring Joie Sullivan to me for evaluation. Below are my notes for this consultation.    If you have questions, please do not hesitate to call me. I look forward to following your patient along with you.         Sincerely,        Amy Montero MD        CC: No Recipients  Amy Ken MD  2/16/2023  1:27 PM  Signed       Cardiology Office Visit     Patient ID: Joie Sullivan 72 y.o. female 1950  PCP: Cris Quiroz MD   History Present Illness     Joie Sullivan returns to the office today for follow-up of her difficult to control hypertension.  She may recall that she had a history of hypertension for some time, sinus bradycardia, dyspnea on exertion and eventually an abnormal stress test that was concerning for anterior ischemia.  LV function was hyperdynamic on echocardiogram.  Given her risk factors and ongoing complaints, we decided to pursue cardiac catheterization for definitive diagnosis.    Cardiac catheterization April 2021 showed no significant coronary disease.  Because I do suspicion of PFO on echocardiogram, she also had a saturation run to rule out shunt and there was no evidence of any left-to-right shunting.  Filling pressures are mildly elevated.    Today she says that she \"knows her blood pressure is high\" because she is under considerable stress.  Unfortunately her son is hospitalized and will likely be returning home this weekend or early next week.  Since he has been hospitalized, she says she has not been able to eat and if she does she gets diarrhea.  She is lost over 10 pounds, but her blood pressure is still elevated.  She is getting headaches almost every day.  She denies any chest pain.  She says prior to her son being hospitalized she was feeling " "quite well.    Allergies/Medications   Allergies:Patient has no known allergies.    Medications:  •  amLODIPine, Take 2 tablets (10 mg total) by mouth once daily.  •  cholecalciferol (vitamin D3), Take 1,000 Units by mouth daily.  •  hydrochlorothiazide, Take 1 tablet (12.5 mg total) by mouth 3 (three) times a week (Mon, Wed, Fri).  •  ketoconazole, Apply 1 application. topically daily. As needed with bilateral hand break out  •  losartan, Take 1 tablet (100 mg total) by mouth once daily.  •  nebivoloL, Take 1 tablet (20 mg total) by mouth daily.   Physical Exam     Vitals:    02/16/23 0834 02/16/23 0854   BP: (!) 180/80 (!) 170/76   BP Location: Right upper arm Left upper arm   Patient Position: Sitting    Pulse: (!) 54    Resp: 20    SpO2: 99%    Weight: 89.6 kg (197 lb 9.6 oz)    Height: 1.676 m (5' 6\")      BP Readings from Last 3 Encounters:   02/16/23 (!) 170/76   04/21/22 140/70   10/15/21 128/66     Wt Readings from Last 3 Encounters:   02/16/23 89.6 kg (197 lb 9.6 oz)   04/21/22 91.2 kg (201 lb)   10/15/21 92.1 kg (203 lb)      Physical Exam:  Constitutional: Well developed.  No apparent distress.  Overweight.  Eyes: No icterus  ENT:  Mucosa moist  Neck: Supple, no JVD or hepatojugular reflux.  No bruits.  Cardiac: Normal S1 and S2, regular rate and rhythm, no S3.  No rub.  No ectopy.  There is a 1/6 systolic murmur at the left sternal border.  Soft S4.  Lungs: Clear to auscultation bilaterally.  No wheezing.  Abdomen: Soft, nontender, positive normoactive bowel sounds.  No bruit.  Extremities: No clubbing or cyanosis.  No calf tenderness.  No edema.  Distal pulses are intact.  Skin: Warm and dry.  No jaundice.  Musculoskeletal: No joint swelling or erythema.  Neurologic: Awake, alert, oriented.  Moving all extremities.  Psychiatric: No agitation.    Labs/Imaging/Procedures   Labs  6/24/2022: CBC within normal limits.  Glucose 108, BUN 14, creatinine 0.7, potassium 4, LFTs unremarkable.  Total cholesterol " 167, triglycerides 83, HDL 56 and LDL 95 mg/dL.  Hemoglobin A1c 6.2, TSH 1.47, vitamin D 27    Imaging  Cardiac catheterization 4/30/2021: No significant coronary artery disease.  No evidence of left to right shunt on right heart catheterization.  Cardiac index 3.5 L/min/m².  Mildly elevated filling pressures with RA 7, PA 49/14 with mean 29, PCWP 18 mmHg and a V wave up to 35 mmHg.      Pharmacologic MPI 4/5/2021: Negative pharmacologic EKG.  LVEF 70%.  Moderate-sized anterior defect with some reversibility suggestive of LAD ischemia.     Echo 4/5/2021: TDS.  Normal LV size.  Top normal wall thickness.  LVEF 65-70% with grossly normal wall motion.  Normal RV size and function.  Mild TR.  PASP 40-45 mmHg.  Moderately dilated left atrium.  Inflow suggestive of elevated filling pressures.  Probable PFO by color Doppler-no agitated saline was administered.  Trileaflet sclerotic aortic valve.  No aortic stenosis.  Trace AI.  Mild MR.  No significant pericardial effusion.  Compared to prior study October 2019, pulmonary pressures have increased.    EKG  Assessment/Plan     1. Essential hypertension    2. Sinus bradycardia    3. Abnormal stress test       Her blood pressure is elevated and I suspect will remain so for the next several weeks, at least until the situation with her son improves.  Her heart rate is too slow for additional up titration of her beta-blocker.  She is on a low-dose of diuretic and I really do not want to increase this considering that she is not eating and also having diarrhea.  I asked her to increase her amlodipine from 5 mg to 10 mg daily.  I also recommended that she start monitoring her blood pressure again at home and let me know if she is getting any readings of 110 mmHg or less, or if she develops symptoms of lightheadedness or increasing fatigue.  Hopefully this will just be a temporary situation since she was feeling well prior to this.  We reviewed her available lab work.  I did not  make any other changes in her current medical regimen.  She will contact me with any questions or concerns regarding her blood pressure and I will see her back sooner than usual.    Return in about 4 months (around 6/16/2023) for follow-up, earlier if any change in symptoms.  Available medical records reviewed and updated including laboratory data, imaging studies, previous outpatient and inpatient records.  Counseling/education performed. Care everywhere utilized where appropriate.    Thank you for allowing me to participate in the care of this patient.  I hope this information is helpful.      Amy Montero MD [facility], [facility]  2/16/2023  1:21 PM  This document was generated utilizing voice recognition technology. Please excuse any typographical errors which may be present.

## 2023-02-16 NOTE — PROGRESS NOTES
"     Cardiology Office Visit     Patient ID: Joie Sullivan 72 y.o. female 1950  PCP: Cris Quiroz MD   History Present Illness     Joie Sullivan returns to the office today for follow-up of her difficult to control hypertension.  She may recall that she had a history of hypertension for some time, sinus bradycardia, dyspnea on exertion and eventually an abnormal stress test that was concerning for anterior ischemia.  LV function was hyperdynamic on echocardiogram.  Given her risk factors and ongoing complaints, we decided to pursue cardiac catheterization for definitive diagnosis.    Cardiac catheterization April 2021 showed no significant coronary disease.  Because I do suspicion of PFO on echocardiogram, she also had a saturation run to rule out shunt and there was no evidence of any left-to-right shunting.  Filling pressures are mildly elevated.    Today she says that she \"knows her blood pressure is high\" because she is under considerable stress.  Unfortunately her son is hospitalized and will likely be returning home this weekend or early next week.  Since he has been hospitalized, she says she has not been able to eat and if she does she gets diarrhea.  She is lost over 10 pounds, but her blood pressure is still elevated.  She is getting headaches almost every day.  She denies any chest pain.  She says prior to her son being hospitalized she was feeling quite well.    Allergies/Medications   Allergies:Patient has no known allergies.    Medications:  •  amLODIPine, Take 2 tablets (10 mg total) by mouth once daily.  •  cholecalciferol (vitamin D3), Take 1,000 Units by mouth daily.  •  hydrochlorothiazide, Take 1 tablet (12.5 mg total) by mouth 3 (three) times a week (Mon, Wed, Fri).  •  ketoconazole, Apply 1 application. topically daily. As needed with bilateral hand break out  •  losartan, Take 1 tablet (100 mg total) by mouth once daily.  •  nebivoloL, Take 1 tablet (20 mg total) by mouth daily. " "  Physical Exam     Vitals:    02/16/23 0834 02/16/23 0854   BP: (!) 180/80 (!) 170/76   BP Location: Right upper arm Left upper arm   Patient Position: Sitting    Pulse: (!) 54    Resp: 20    SpO2: 99%    Weight: 89.6 kg (197 lb 9.6 oz)    Height: 1.676 m (5' 6\")      BP Readings from Last 3 Encounters:   02/16/23 (!) 170/76   04/21/22 140/70   10/15/21 128/66     Wt Readings from Last 3 Encounters:   02/16/23 89.6 kg (197 lb 9.6 oz)   04/21/22 91.2 kg (201 lb)   10/15/21 92.1 kg (203 lb)      Physical Exam:  Constitutional: Well developed.  No apparent distress.  Overweight.  Eyes: No icterus  ENT:  Mucosa moist  Neck: Supple, no JVD or hepatojugular reflux.  No bruits.  Cardiac: Normal S1 and S2, regular rate and rhythm, no S3.  No rub.  No ectopy.  There is a 1/6 systolic murmur at the left sternal border.  Soft S4.  Lungs: Clear to auscultation bilaterally.  No wheezing.  Abdomen: Soft, nontender, positive normoactive bowel sounds.  No bruit.  Extremities: No clubbing or cyanosis.  No calf tenderness.  No edema.  Distal pulses are intact.  Skin: Warm and dry.  No jaundice.  Musculoskeletal: No joint swelling or erythema.  Neurologic: Awake, alert, oriented.  Moving all extremities.  Psychiatric: No agitation.    Labs/Imaging/Procedures   Labs  6/24/2022: CBC within normal limits.  Glucose 108, BUN 14, creatinine 0.7, potassium 4, LFTs unremarkable.  Total cholesterol 167, triglycerides 83, HDL 56 and LDL 95 mg/dL.  Hemoglobin A1c 6.2, TSH 1.47, vitamin D 27    Imaging  Cardiac catheterization 4/30/2021: No significant coronary artery disease.  No evidence of left to right shunt on right heart catheterization.  Cardiac index 3.5 L/min/m².  Mildly elevated filling pressures with RA 7, PA 49/14 with mean 29, PCWP 18 mmHg and a V wave up to 35 mmHg.      Pharmacologic MPI 4/5/2021: Negative pharmacologic EKG.  LVEF 70%.  Moderate-sized anterior defect with some reversibility suggestive of LAD ischemia.     Echo " 4/5/2021: TDS.  Normal LV size.  Top normal wall thickness.  LVEF 65-70% with grossly normal wall motion.  Normal RV size and function.  Mild TR.  PASP 40-45 mmHg.  Moderately dilated left atrium.  Inflow suggestive of elevated filling pressures.  Probable PFO by color Doppler-no agitated saline was administered.  Trileaflet sclerotic aortic valve.  No aortic stenosis.  Trace AI.  Mild MR.  No significant pericardial effusion.  Compared to prior study October 2019, pulmonary pressures have increased.    EKG  Assessment/Plan     1. Essential hypertension    2. Sinus bradycardia    3. Abnormal stress test       Her blood pressure is elevated and I suspect will remain so for the next several weeks, at least until the situation with her son improves.  Her heart rate is too slow for additional up titration of her beta-blocker.  She is on a low-dose of diuretic and I really do not want to increase this considering that she is not eating and also having diarrhea.  I asked her to increase her amlodipine from 5 mg to 10 mg daily.  I also recommended that she start monitoring her blood pressure again at home and let me know if she is getting any readings of 110 mmHg or less, or if she develops symptoms of lightheadedness or increasing fatigue.  Hopefully this will just be a temporary situation since she was feeling well prior to this.  We reviewed her available lab work.  I did not make any other changes in her current medical regimen.  She will contact me with any questions or concerns regarding her blood pressure and I will see her back sooner than usual.    Return in about 4 months (around 6/16/2023) for follow-up, earlier if any change in symptoms.  Available medical records reviewed and updated including laboratory data, imaging studies, previous outpatient and inpatient records.  Counseling/education performed. Care everywhere utilized where appropriate.    Thank you for allowing me to participate in the care of this  patient.  I hope this information is helpful.      Amy Montero MD [facility], FASE  2/16/2023  1:21 PM  This document was generated utilizing voice recognition technology. Please excuse any typographical errors which may be present.

## 2023-03-09 DIAGNOSIS — I10 ESSENTIAL HYPERTENSION: ICD-10-CM

## 2023-03-09 RX ORDER — NEBIVOLOL 20 MG/1
20 TABLET ORAL DAILY
Qty: 90 TABLET | Refills: 3 | Status: SHIPPED | OUTPATIENT
Start: 2023-03-09 | End: 2024-03-11

## 2023-03-09 NOTE — TELEPHONE ENCOUNTER
Medicine Refill Request    Last Office: Visit date not found   Last Consult Visit: Visit date not found  Last Telemedicine Visit: Visit date not found    Next Appointment: Visit date not found  nebivoloL (BYSTOLIC) 20 mg tablet    Pt states that [facility] have told her the insurance doesn't cover it any more. Pt can get the script at [facility] and selfpay)    Current Outpatient Medications:   •  amLODIPine (NORVASC) 5 mg tablet, Take 2 tablets (10 mg total) by mouth once daily., Disp: 180 tablet, Rfl: 3  •  cholecalciferol, vitamin D3, (VITAMIN D3) 25 mcg (1,000 unit) capsule, Take 1,000 Units by mouth daily., Disp: , Rfl:   •  hydrochlorothiazide (HYDRODIURIL) 12.5 mg tablet, Take 1 tablet (12.5 mg total) by mouth 3 (three) times a week (Mon, Wed, Fri)., Disp: 39 tablet, Rfl: 3  •  ketoconazole (NIZORAL) 2 % cream, Apply 1 application. topically daily. As needed with bilateral hand break out, Disp: , Rfl:   •  losartan (COZAAR) 100 mg tablet, Take 1 tablet (100 mg total) by mouth once daily., Disp: 90 tablet, Rfl: 3  •  nebivoloL (BYSTOLIC) 20 mg tablet, Take 1 tablet (20 mg total) by mouth daily., Disp: 90 tablet, Rfl: 3      BP Readings from Last 3 Encounters:   02/16/23 (!) 170/76   04/21/22 140/70   10/15/21 128/66       Recent Lab results:  No results found for: CHOL, No results found for: HDL, No results found for: LDLCALC, No results found for: TRIG     Lab Results   Component Value Date    GLUCOSE 99 04/26/2021   , No results found for: HGBA1C      Lab Results   Component Value Date    CREATININE 0.6 04/26/2021       No results found for: TSH

## 2023-06-16 ENCOUNTER — OFFICE VISIT (OUTPATIENT)
Dept: CARDIOLOGY | Facility: CLINIC | Age: 73
End: 2023-06-16
Payer: COMMERCIAL

## 2023-06-16 ENCOUNTER — TELEPHONE (OUTPATIENT)
Dept: CARDIOLOGY | Facility: CLINIC | Age: 73
End: 2023-06-16

## 2023-06-16 VITALS
BODY MASS INDEX: 31.66 KG/M2 | SYSTOLIC BLOOD PRESSURE: 128 MMHG | DIASTOLIC BLOOD PRESSURE: 78 MMHG | RESPIRATION RATE: 18 BRPM | HEIGHT: 66 IN | WEIGHT: 197 LBS | HEART RATE: 57 BPM | OXYGEN SATURATION: 97 %

## 2023-06-16 DIAGNOSIS — Q21.12 PFO (PATENT FORAMEN OVALE): ICD-10-CM

## 2023-06-16 DIAGNOSIS — I10 ESSENTIAL HYPERTENSION: ICD-10-CM

## 2023-06-16 DIAGNOSIS — R00.1 SINUS BRADYCARDIA: Primary | ICD-10-CM

## 2023-06-16 DIAGNOSIS — R06.02 SHORTNESS OF BREATH: ICD-10-CM

## 2023-06-16 DIAGNOSIS — R06.00 PND (PAROXYSMAL NOCTURNAL DYSPNEA): ICD-10-CM

## 2023-06-16 DIAGNOSIS — R94.39 ABNORMAL STRESS TEST: ICD-10-CM

## 2023-06-16 PROCEDURE — 93000 ELECTROCARDIOGRAM COMPLETE: CPT | Performed by: INTERNAL MEDICINE

## 2023-06-16 PROCEDURE — 3008F BODY MASS INDEX DOCD: CPT | Performed by: INTERNAL MEDICINE

## 2023-06-16 PROCEDURE — 3078F DIAST BP <80 MM HG: CPT | Performed by: INTERNAL MEDICINE

## 2023-06-16 PROCEDURE — 3074F SYST BP LT 130 MM HG: CPT | Performed by: INTERNAL MEDICINE

## 2023-06-16 PROCEDURE — 99214 OFFICE O/P EST MOD 30 MIN: CPT | Performed by: INTERNAL MEDICINE

## 2023-06-16 RX ORDER — AMLODIPINE BESYLATE 10 MG/1
10 TABLET ORAL
Qty: 90 TABLET | Refills: 3 | COMMUNITY
Start: 2023-06-16 | End: 2023-09-26 | Stop reason: SDUPTHER

## 2023-06-16 NOTE — PROGRESS NOTES
"     Cardiology Office Visit     Patient ID: Joie Sullivan 72 y.o. female 1950  PCP: Cris Quiroz MD   History Present Illness     Joie Sullivan returns to the office today for follow-up of her difficult to control hypertension.  She may recall that she had a history of hypertension for some time, sinus bradycardia, dyspnea on exertion and eventually an abnormal stress test that was concerning for anterior ischemia.  LV function was hyperdynamic on echocardiogram.  Given her risk factors and ongoing complaints, we decided to pursue cardiac catheterization for definitive diagnosis.    Cardiac catheterization April 2021 showed no significant coronary disease.  Because I do suspicion of PFO on echocardiogram, she also had a saturation run to rule out shunt and there was no evidence of any left-to-right shunting.  Filling pressures are mildly elevated.    At her last visit her blood pressure was elevated and she is now on amlodipine 10 mg daily.  This seems to have improved her pressures, although she was hoping to be able to decrease the medication.  Unfortunately she remains under considerable stress due to the illness of her adult son.  She is trying to walk on campus and says that her office moved, so she needs to do even more walking.  Her weight is stable at 197 pounds.  She is compliant with all her medications.  Her blood pressure decreased after she sat and we talked for a bit in the examination room-she seems to be under good control on the increased dose of amlodipine.    She did report one concerning symptom.  Apparently she has had a few episodes where she is abruptly awakened from sleep and needs to set up because she is \"gasping for air\".  There is no associated chest pain or palpitations.  One happened while she was sleeping on the sofa, but a more severe episode occurred a few times while in bed.  She has never had a sleep study.    Allergies/Medications   Allergies:Patient has no known " "allergies.    Medications:  •  amLODIPine, Take 1 tablet (10 mg total) by mouth once daily.  •  cholecalciferol (vitamin D3), Take 1,000 Units by mouth daily.  •  hydrochlorothiazide, Take 1 tablet (12.5 mg total) by mouth 3 (three) times a week (Mon, Wed, Fri).  •  ketoconazole, Apply 1 application. topically daily. As needed with bilateral hand break out  •  losartan, Take 1 tablet (100 mg total) by mouth once daily.  •  nebivoloL, Take 1 tablet (20 mg total) by mouth daily.   Physical Exam     Vitals:    06/16/23 0946 06/16/23 0957   BP: 140/82 128/78   BP Location: Left upper arm Right upper arm   Patient Position: Sitting    Pulse: (!) 57    Resp: 18    SpO2: 97%    Weight: 89.4 kg (197 lb)    Height: 1.676 m (5' 6\")      BP Readings from Last 3 Encounters:   06/16/23 128/78   02/16/23 (!) 170/76   04/21/22 140/70     Wt Readings from Last 3 Encounters:   06/16/23 89.4 kg (197 lb)   02/16/23 89.6 kg (197 lb 9.6 oz)   04/21/22 91.2 kg (201 lb)      Physical Exam:  Constitutional: Well developed.  No apparent distress.  Overweight.  Eyes: No icterus  ENT:  Mucosa moist  Neck: Supple, no JVD or hepatojugular reflux.  No bruits.  Cardiac: Normal S1 and S2, regular rate and rhythm, no S3.  No rub.  No ectopy.  There is a 1/6 systolic murmur at the left sternal border.  Soft S4.  Lungs: Clear to auscultation bilaterally.  No wheezing.  Abdomen: Soft, nontender, positive normoactive bowel sounds.  No bruit.  Extremities: No clubbing or cyanosis.  No calf tenderness.  No edema.  Distal pulses are intact.  Skin: Warm and dry.  No jaundice.  Musculoskeletal: No joint swelling or erythema.  Neurologic: Awake, alert, oriented.  Moving all extremities.  Psychiatric: No agitation.    Labs/Imaging/Procedures   Labs  6/24/2022: CBC within normal limits.  Glucose 108, BUN 14, creatinine 0.7, potassium 4, LFTs unremarkable.  Total cholesterol 167, triglycerides 83, HDL 56 and LDL 95 mg/dL.  Hemoglobin A1c 6.2, TSH 1.47, vitamin " "D 27    Imaging  Cardiac catheterization 4/30/2021: No significant coronary artery disease.  No evidence of left to right shunt on right heart catheterization.  Cardiac index 3.5 L/min/m².  Mildly elevated filling pressures with RA 7, PA 49/14 with mean 29, PCWP 18 mmHg and a V wave up to 35 mmHg.      Pharmacologic MPI 4/5/2021: Negative pharmacologic EKG.  LVEF 70%.  Moderate-sized anterior defect with some reversibility suggestive of LAD ischemia.     Echo 4/5/2021: TDS.  Normal LV size.  Top normal wall thickness.  LVEF 65-70% with grossly normal wall motion.  Normal RV size and function.  Mild TR.  PASP 40-45 mmHg.  Moderately dilated left atrium.  Inflow suggestive of elevated filling pressures.  Probable PFO by color Doppler-no agitated saline was administered.  Trileaflet sclerotic aortic valve.  No aortic stenosis.  Trace AI.  Mild MR.  No significant pericardial effusion.  Compared to prior study October 2019, pulmonary pressures have increased.    EKG  Assessment/Plan     1. Sinus bradycardia    2. Essential hypertension    3. Abnormal stress test    4. Shortness of breath    5. PFO (patent foramen ovale)    6. PND (paroxysmal nocturnal dyspnea)       Blood pressure is controlled and I am continuing amlodipine 10 mg daily along with hydrochlorothiazide 3 times a week and losartan 100 mg daily.  She continues on nebivolol 20 mg daily-she was unsure if she would be able to continue this because of cost but she is using good Rx.  EKG does not show any acute changes.  As noted, she had a cardiac catheterization without any evidence of obstructive vascular disease.  She did have an elevated wedge pressure and prominent V wave.    Her symptoms of PND are concerning.  I gave her several names of sleep physicians because I strongly recommend she get a sleep study.  She says that she \"does not want to go anywhere\" but hopefully she could start with a home study.  I have also asked her to return for an " echocardiogram to rule out any change in her LV function, especially considering how much stress she is under currently.  We can also assess her left atrial size, mitral regurgitation and Doppler inflow pattern with respect to filling pressures.  She has a known PFO but there was no evidence of any significant step up on right heart catheterization.    Her sinus bradycardia is a known finding and asymptomatic.  I would continue her beta-blocker.    Return in about 6 months (around 12/16/2023) for follow-up, earlier if any change in symptoms.  Available medical records reviewed and updated including laboratory data, imaging studies, previous outpatient and inpatient records.  Counseling/education performed. Care everywhere utilized where appropriate.    Thank you for allowing me to participate in the care of this patient.  I hope this information is helpful.      Amy Montero MD [facility], FASE  6/16/2023  1:12 PM  This document was generated utilizing voice recognition technology. Please excuse any typographical errors which may be present.

## 2023-06-16 NOTE — LETTER
June 16, 2023     Cris Quiroz MD  [address]  [facility] [address]    Patient: Joie Sullivan  YOB: 1950  Date of Visit: 6/16/2023      Dear Dr. Quiroz:    Thank you for referring Joie Sullivan to me for evaluation. Below are my notes for this consultation.    If you have questions, please do not hesitate to call me. I look forward to following your patient along with you.         Sincerely,        Amy Montero MD        CC: No Recipients    Amy Ken MD  6/16/2023  1:15 PM  Signed       Cardiology Office Visit     Patient ID: Joie Sullivan 72 y.o. female 1950  PCP: Cris Quiroz MD   History Present Illness     Joie Sullivan returns to the office today for follow-up of her difficult to control hypertension.  She may recall that she had a history of hypertension for some time, sinus bradycardia, dyspnea on exertion and eventually an abnormal stress test that was concerning for anterior ischemia.  LV function was hyperdynamic on echocardiogram.  Given her risk factors and ongoing complaints, we decided to pursue cardiac catheterization for definitive diagnosis.    Cardiac catheterization April 2021 showed no significant coronary disease.  Because I do suspicion of PFO on echocardiogram, she also had a saturation run to rule out shunt and there was no evidence of any left-to-right shunting.  Filling pressures are mildly elevated.    At her last visit her blood pressure was elevated and she is now on amlodipine 10 mg daily.  This seems to have improved her pressures, although she was hoping to be able to decrease the medication.  Unfortunately she remains under considerable stress due to the illness of her adult son.  She is trying to walk on campus and says that her office moved, so she needs to do even more walking.  Her weight is stable at 197 pounds.  She is compliant with all her medications.  Her blood pressure decreased after she sat and we  "talked for a bit in the examination room-she seems to be under good control on the increased dose of amlodipine.    She did report one concerning symptom.  Apparently she has had a few episodes where she is abruptly awakened from sleep and needs to set up because she is \"gasping for air\".  There is no associated chest pain or palpitations.  One happened while she was sleeping on the sofa, but a more severe episode occurred a few times while in bed.  She has never had a sleep study.    Allergies/Medications   Allergies:Patient has no known allergies.    Medications:  •  amLODIPine, Take 1 tablet (10 mg total) by mouth once daily.  •  cholecalciferol (vitamin D3), Take 1,000 Units by mouth daily.  •  hydrochlorothiazide, Take 1 tablet (12.5 mg total) by mouth 3 (three) times a week (Mon, Wed, Fri).  •  ketoconazole, Apply 1 application. topically daily. As needed with bilateral hand break out  •  losartan, Take 1 tablet (100 mg total) by mouth once daily.  •  nebivoloL, Take 1 tablet (20 mg total) by mouth daily.   Physical Exam     Vitals:    06/16/23 0946 06/16/23 0957   BP: 140/82 128/78   BP Location: Left upper arm Right upper arm   Patient Position: Sitting    Pulse: (!) 57    Resp: 18    SpO2: 97%    Weight: 89.4 kg (197 lb)    Height: 1.676 m (5' 6\")      BP Readings from Last 3 Encounters:   06/16/23 128/78   02/16/23 (!) 170/76   04/21/22 140/70     Wt Readings from Last 3 Encounters:   06/16/23 89.4 kg (197 lb)   02/16/23 89.6 kg (197 lb 9.6 oz)   04/21/22 91.2 kg (201 lb)      Physical Exam:  Constitutional: Well developed.  No apparent distress.  Overweight.  Eyes: No icterus  ENT:  Mucosa moist  Neck: Supple, no JVD or hepatojugular reflux.  No bruits.  Cardiac: Normal S1 and S2, regular rate and rhythm, no S3.  No rub.  No ectopy.  There is a 1/6 systolic murmur at the left sternal border.  Soft S4.  Lungs: Clear to auscultation bilaterally.  No wheezing.  Abdomen: Soft, nontender, positive normoactive " bowel sounds.  No bruit.  Extremities: No clubbing or cyanosis.  No calf tenderness.  No edema.  Distal pulses are intact.  Skin: Warm and dry.  No jaundice.  Musculoskeletal: No joint swelling or erythema.  Neurologic: Awake, alert, oriented.  Moving all extremities.  Psychiatric: No agitation.    Labs/Imaging/Procedures   Labs  6/24/2022: CBC within normal limits.  Glucose 108, BUN 14, creatinine 0.7, potassium 4, LFTs unremarkable.  Total cholesterol 167, triglycerides 83, HDL 56 and LDL 95 mg/dL.  Hemoglobin A1c 6.2, TSH 1.47, vitamin D 27    Imaging  Cardiac catheterization 4/30/2021: No significant coronary artery disease.  No evidence of left to right shunt on right heart catheterization.  Cardiac index 3.5 L/min/m².  Mildly elevated filling pressures with RA 7, PA 49/14 with mean 29, PCWP 18 mmHg and a V wave up to 35 mmHg.      Pharmacologic MPI 4/5/2021: Negative pharmacologic EKG.  LVEF 70%.  Moderate-sized anterior defect with some reversibility suggestive of LAD ischemia.     Echo 4/5/2021: TDS.  Normal LV size.  Top normal wall thickness.  LVEF 65-70% with grossly normal wall motion.  Normal RV size and function.  Mild TR.  PASP 40-45 mmHg.  Moderately dilated left atrium.  Inflow suggestive of elevated filling pressures.  Probable PFO by color Doppler-no agitated saline was administered.  Trileaflet sclerotic aortic valve.  No aortic stenosis.  Trace AI.  Mild MR.  No significant pericardial effusion.  Compared to prior study October 2019, pulmonary pressures have increased.    EKG  Assessment/Plan     1. Sinus bradycardia    2. Essential hypertension    3. Abnormal stress test    4. Shortness of breath    5. PFO (patent foramen ovale)    6. PND (paroxysmal nocturnal dyspnea)       Blood pressure is controlled and I am continuing amlodipine 10 mg daily along with hydrochlorothiazide 3 times a week and losartan 100 mg daily.  She continues on nebivolol 20 mg daily-she was unsure if she would be able to  "continue this because of cost but she is using good Rx.  EKG does not show any acute changes.  As noted, she had a cardiac catheterization without any evidence of obstructive vascular disease.  She did have an elevated wedge pressure and prominent V wave.    Her symptoms of PND are concerning.  I gave her several names of sleep physicians because I strongly recommend she get a sleep study.  She says that she \"does not want to go anywhere\" but hopefully she could start with a home study.  I have also asked her to return for an echocardiogram to rule out any change in her LV function, especially considering how much stress she is under currently.  We can also assess her left atrial size, mitral regurgitation and Doppler inflow pattern with respect to filling pressures.  She has a known PFO but there was no evidence of any significant step up on right heart catheterization.    Her sinus bradycardia is a known finding and asymptomatic.  I would continue her beta-blocker.    Return in about 6 months (around 12/16/2023) for follow-up, earlier if any change in symptoms.  Available medical records reviewed and updated including laboratory data, imaging studies, previous outpatient and inpatient records.  Counseling/education performed. Care everywhere utilized where appropriate.    Thank you for allowing me to participate in the care of this patient.  I hope this information is helpful.      Amy Montero MD [facility], FASE  6/16/2023  1:12 PM  This document was generated utilizing voice recognition technology. Please excuse any typographical errors which may be present.    "

## 2023-07-14 ENCOUNTER — HOSPITAL ENCOUNTER (OUTPATIENT)
Dept: CARDIOLOGY | Facility: CLINIC | Age: 73
Discharge: HOME | End: 2023-07-14
Attending: INTERNAL MEDICINE
Payer: COMMERCIAL

## 2023-07-14 VITALS
WEIGHT: 197 LBS | DIASTOLIC BLOOD PRESSURE: 62 MMHG | BODY MASS INDEX: 31.66 KG/M2 | HEIGHT: 66 IN | SYSTOLIC BLOOD PRESSURE: 155 MMHG

## 2023-07-14 DIAGNOSIS — R06.00 PND (PAROXYSMAL NOCTURNAL DYSPNEA): ICD-10-CM

## 2023-07-14 DIAGNOSIS — I10 ESSENTIAL HYPERTENSION: ICD-10-CM

## 2023-07-14 DIAGNOSIS — Q21.12 PFO (PATENT FORAMEN OVALE): ICD-10-CM

## 2023-07-14 PROCEDURE — 93306 TTE W/DOPPLER COMPLETE: CPT | Performed by: INTERNAL MEDICINE

## 2023-07-17 LAB
AORTIC ROOT ANNULUS: 3.5 CM
ASCENDING AORTA: 3.3 CM
AV PEAK GRADIENT: 9 MMHG
AV PEAK VELOCITY-S: 1.53 M/S
AV VALVE AREA: 2.19 CM2
BSA FOR ECHO PROCEDURE: 2.04 M2
E WAVE DECELERATION TIME: 225 MS
E/A RATIO: 2.5
E/E' RATIO: 20.8
E/LAT E' RATIO: 13
EDV (BP): 86.8 CM3
EF (A4C): 72.2 %
EF A2C: 62.5 %
EJECTION FRACTION: 67.4 %
EST RIGHT VENT SYSTOLIC PRESSURE BY TRICUSPID REGURGITATION JET: 41 MMHG
ESV (BP): 28.3 CM3
FRACTIONAL SHORTENING: 43.57 %
HEART RATE: 56 BPM
INTERVENTRICULAR SEPTUM: 1.14 CM
LA ESV (BP): 94.9 CM3
LA ESV INDEX (A2C): 35.34 CM3/M2
LA ESV INDEX (BP): 46.52 CM3/M2
LA/AORTA RATIO: 1.4
LAAS-AP2: 21 CM2
LAAS-AP4: 28.6 CM2
LAD 2D: 4.9 CM
LALD A4C: 5.07 CM
LALD A4C: 6.22 CM
LAV-S: 72.1 CM3
LEFT ATRIUM VOLUME INDEX: 42.7 CM3/M2
LEFT ATRIUM VOLUME: 87.1 CM3
LEFT INTERNAL DIMENSION IN SYSTOLE: 2.37 CM (ref 2.88–4.36)
LEFT VENTRICLE DIASTOLIC VOLUME INDEX: 50.98 CM3/M2
LEFT VENTRICLE DIASTOLIC VOLUME: 104 CM3
LEFT VENTRICLE SYSTOLIC VOLUME INDEX: 14.17 CM3/M2
LEFT VENTRICLE SYSTOLIC VOLUME: 28.9 CM3
LEFT VENTRICULAR INTERNAL DIMENSION IN DIASTOLE: 4.2 CM (ref 4.9–6.8)
LEFT VENTRICULAR POSTERIOR WALL IN END DIASTOLE: 1.05 CM (ref 0.64–1.18)
LV DIASTOLIC VOLUME: 71.4 CM3
LV ESV (APICAL 2 CHAMBER): 26.7 CM3
LVAD-AP2: 24.5 CM2
LVAD-AP4: 30.2 CM2
LVAS-AP2: 13.7 CM2
LVAS-AP4: 14 CM2
LVEDVI(A2C): 35 CM3/M2
LVEDVI(BP): 42.55 CM3/M2
LVESVI(A2C): 13.09 CM3/M2
LVESVI(BP): 13.87 CM3/M2
LVLD-AP2: 6.87 CM
LVLD-AP4: 7.02 CM
LVLS-AP2: 5.76 CM
LVLS-AP4: 5.51 CM
LVOT 2D: 2 CM
LVOT A: 3.14 CM2
LVOT PEAK VELOCITY: 1.36 M/S
LVOT PG: 7 MMHG
MLH CV ECHO AVA INDEX VELOCITY RATIO: 1.1
MV E'TISSUE VEL-LAT: 0.1 M/S
MV E'TISSUE VEL-MED: 0.06 M/S
MV PEAK A VEL: 0.51 M/S
MV PEAK E VEL: 1.29 M/S
POSTERIOR WALL: 1.05 CM
PULMONARY REGURGITATION LATE DIASTOLIC VELOCITY: 1.38 M/S
PV PEAK GRADIENT: 4 MMHG
PV PV: 1.06 M/S
RAP: 5 MMHG
RVOT VMAX: 0.91 M/S
RVOT VTI: 24.2 CM
SEPTAL TISSUE DOPPLER FREE WALL LATE DIA VELOCITY (APICAL 4 CHAMBER VIEW): 0.17 M/S
TR MAX PG: 36 MMHG
TRICUSPID VALVE PEAK REGURGITATION VELOCITY: 3 M/S
Z-SCORE OF LEFT VENTRICULAR DIMENSION IN END DIASTOLE: -3.18
Z-SCORE OF LEFT VENTRICULAR DIMENSION IN END SYSTOLE: -3.18
Z-SCORE OF LEFT VENTRICULAR POSTERIOR WALL IN END DIASTOLE: 1

## 2023-09-26 DIAGNOSIS — I10 ESSENTIAL HYPERTENSION: ICD-10-CM

## 2023-09-26 RX ORDER — AMLODIPINE BESYLATE 10 MG/1
10 TABLET ORAL
Qty: 90 TABLET | Refills: 3 | Status: SHIPPED | OUTPATIENT
Start: 2023-09-26 | End: 2024-07-18 | Stop reason: SDUPTHER

## 2023-09-26 NOTE — TELEPHONE ENCOUNTER
"Medicine Refill Request [facility]    Name of Patient: Joie Sullivan    Caller's name/Relationship: Self     Callback number: [phone]    Medication Name, Dosage, Supply: amLODIPine (NORVASC) 10 mg tablet #90    Quantity left: Out of Meds     Pharmacy: [facility]     Last Office Visit:   Last Consult Visit: Visit date not found  Last Telemedicine Visit: Visit date not found    Next Appointment: Visit date not found      Current Outpatient Medications:     amLODIPine (NORVASC) 10 mg tablet, Take 1 tablet (10 mg total) by mouth once daily., Disp: 90 tablet, Rfl: 3    cholecalciferol, vitamin D3, (VITAMIN D3) 25 mcg (1,000 unit) capsule, Take 1,000 Units by mouth daily., Disp: , Rfl:     hydrochlorothiazide (HYDRODIURIL) 12.5 mg tablet, Take 1 tablet (12.5 mg total) by mouth 3 (three) times a week (Mon, Wed, Fri)., Disp: 39 tablet, Rfl: 3    ketoconazole (NIZORAL) 2 % cream, Apply 1 application. topically daily. As needed with bilateral hand break out, Disp: , Rfl:     losartan (COZAAR) 100 mg tablet, TAKE 1 TABLET BY MOUTH EVERY DAY, Disp: 90 tablet, Rfl: 3    nebivoloL (BYSTOLIC) 20 mg tablet, Take 1 tablet (20 mg total) by mouth daily., Disp: 90 tablet, Rfl: 3      BP Readings from Last 3 Encounters:   07/14/23 (!) 155/62   06/16/23 128/78   02/16/23 (!) 170/76       Recent Lab results:  No results found for: \"CHOL\", No results found for: \"HDL\", No results found for: \"LDLCALC\", No results found for: \"TRIG\"     Lab Results   Component Value Date    GLUCOSE 99 04/26/2021   , No results found for: \"HGBA1C\"      Lab Results   Component Value Date    CREATININE 0.6 04/26/2021       No results found for: \"TSH\"        "
Escribed to [facility]    
normal sinus rhythm, Normal axis, Normal NV interval and QRS complex. There are no acute ischemic ST or T-wave changes.

## 2023-12-19 NOTE — PROGRESS NOTES
Cardiology Office Visit     Patient ID: Joie Sullivan 73 y.o. female 1950  PCP: Cris Quiroz MD   History Present Illness     Joie Sullivan returns to the office today for follow-up of her hypertension and dyspnea on exertion.  Was difficult for us to get her blood pressure under control but it has been stable now for some time.  She was recently at another doctor's visit and was told that her legs were swollen-she did not think this was the case, but she was told to increase her hydrochlorothiazide to 5 times a week.  She did this for short time but then went back to 3 times a week dosing and wants my opinion about this.    She has had dyspnea on exertion for some time, although this is improved with better blood pressure control.  Her office moved on the campus and she is walking a longer distance which she finds difficult, but she does not really engage in much regular exercise outside of this.  We performed cardiac catheterization in April 2021 and she had no significant coronary disease.  There was a question of the PFO and her echocardiogram but a saturation run ruled out any evidence of shunt.  Her filling pressures were mildly elevated.    She is on amlodipine 10 mg daily and I think this is the most likely etiology for her leg swelling.  I think her legs look slightly swollen, but she does not think they are any different than baseline.  She is compliant with all of her medications.  She denies PND or orthopnea.  Her weight is up a bit and she admits that it is sometimes hard for her to watch her diet.    Allergies/Medications   Allergies:Patient has no known allergies.    Medications:   •  amLODIPine, Take 1 tablet (10 mg total) by mouth once daily.  •  cholecalciferol (vitamin D3), Take 1,000 Units by mouth daily.  •  hydrochlorothiazide, Take 1 tablet (12.5 mg total) by mouth 5 (five) times a week (Mon, Tue, Wed, Thu, Fri).  •  ketoconazole, Apply 1 application. topically daily. As  "needed with bilateral hand break out  •  losartan, Take 1 tablet (100 mg total) by mouth daily.  •  nebivoloL, Take 1 tablet (20 mg total) by mouth daily.      Physical Exam     Vitals:    12/28/23 1017   BP: 140/82   BP Location: Left upper arm   Patient Position: Sitting   Pulse: (!) 54   SpO2: 97%   Weight: 92.5 kg (204 lb)   Height: 1.689 m (5' 6.5\")     BP Readings from Last 3 Encounters:   12/28/23 140/82   07/14/23 (!) 155/62   06/16/23 128/78     Wt Readings from Last 3 Encounters:   12/28/23 92.5 kg (204 lb)   07/14/23 89.4 kg (197 lb)   06/16/23 89.4 kg (197 lb)      Physical Exam:  Constitutional: Overweight white female.  No apparent distress.   Eyes: No icterus  ENT:  Mucosa moist  Neck: Supple, no JVD or hepatojugular reflux.  No bruits.  Cardiac: Normal S1 and S2, regular rate and rhythm, no S3.  No rub.  No ectopy.  There is a 1/6 systolic murmur at the left sternal border.  Soft S4.  Lungs: Clear to auscultation bilaterally.  No wheezing.  Abdomen: Soft, nontender, positive normoactive bowel sounds.  No bruit.  Extremities: No clubbing or cyanosis.  No calf tenderness.  Trace edema.  Distal pulses are intact.  Skin: Warm and dry.  No jaundice.  Musculoskeletal: No joint swelling or erythema.  Mild swelling near her right ankle-chronic  Neurologic: Awake, alert, oriented.  Moving all extremities.  Psychiatric: No agitation.    Labs/Imaging/Procedures   Labs  6/24/2022: CBC within normal limits.  Glucose 108, BUN 14, creatinine 0.7, potassium 4, LFTs unremarkable.  Total cholesterol 167, triglycerides 83, HDL 56 and LDL 95 mg/dL.  Hemoglobin A1c 6.2, TSH 1.47, vitamin D 27    Imaging  TTE 7/14/2023:  • Normal LV size with mild concentric LVH.  LVEF 70%.  Wall motion appears normal.   • Mild dilated RV with preserved function.  Mild TR.  Estimated RVSP 41 mmHg.    • Moderately dilated left atrium.  Inflow pattern is suggestive of elevated filling pressures.   • Evidence of PFO by color Doppler. "    • Trileaflet sclerotic aortic valve.  No aortic stenosis.  Trace AI.  • Thickened atrial leaflets with mild mitral regurgitation.  • No significant pericardial effusion.  • Compared to the previous study, there is no significant change.       Cardiac catheterization 4/30/2021: No significant coronary artery disease.  No evidence of left to right shunt on right heart catheterization.  Cardiac index 3.5 L/min/m².  Mildly elevated filling pressures with RA 7, PA 49/14 with mean 29, PCWP 18 mmHg and a V wave up to 35 mmHg.      Pharmacologic MPI 4/5/2021: Negative pharmacologic EKG.  LVEF 70%.  Moderate-sized anterior defect with some reversibility suggestive of LAD ischemia.     Echo 4/5/2021: TDS.  Normal LV size.  Top normal wall thickness.  LVEF 65-70% with grossly normal wall motion.  Normal RV size and function.  Mild TR.  PASP 40-45 mmHg.  Moderately dilated left atrium.  Inflow suggestive of elevated filling pressures.  Probable PFO by color Doppler-no agitated saline was administered.  Trileaflet sclerotic aortic valve.  No aortic stenosis.  Trace AI.  Mild MR.  No significant pericardial effusion.  Compared to prior study October 2019, pulmonary pressures have increased.       EKG  Assessment/Plan     1. Essential hypertension    2. Sinus bradycardia    3. Abnormal stress test    4. Shortness of breath    5. PFO (patent foramen ovale)    6. Lower extremity edema       We reviewed her antihypertensive regimen and the edema-I told her I would increase the hydrochlorothiazide back to 5 times a week.  She was tolerating this without any issue.  Her edema is likely due to the high-dose amlodipine, but it was difficult to get her blood pressure under control and I am reluctant to reduce this.  There is also mechanical component from her weight and her legs being in the dependent position, so I encouraged her to use compression stockings.  She says she has found this difficult because of some skin reactions, but  she is going to try again.  I also told her she should try elevating her legs under her desk and see if this helps.  She would benefit from increased exercise and weight loss.  She has no acute EKG changes, no evidence of volume overload and no new anginal symptoms.  We reviewed her recent echocardiogram and her LV function remains preserved.    Return in about 6 months (around 6/28/2024) for follow-up, earlier if any change in symptoms.  Available medical records reviewed and updated including laboratory data, imaging studies, previous outpatient and inpatient records.  Counseling/education performed. Care everywhere utilized where appropriate.    Thank you for allowing me to participate in the care of this patient.  I hope this information is helpful.      Amy Montero MD, [facility], [facility]  12/28/2023  6:01 PM     By signing my name below, I, Sybil Olsen, attest that this documentation has been prepared under the direction and in the presence of MD Sybil Chanel  12/28/2023 6:01 PM    This document was generated utilizing voice recognition technology. Please excuse any typographical errors which may be present.

## 2023-12-28 ENCOUNTER — OFFICE VISIT (OUTPATIENT)
Dept: CARDIOLOGY | Facility: CLINIC | Age: 73
End: 2023-12-28
Payer: COMMERCIAL

## 2023-12-28 VITALS
WEIGHT: 204 LBS | SYSTOLIC BLOOD PRESSURE: 140 MMHG | HEART RATE: 54 BPM | HEIGHT: 67 IN | OXYGEN SATURATION: 97 % | BODY MASS INDEX: 32.02 KG/M2 | DIASTOLIC BLOOD PRESSURE: 82 MMHG

## 2023-12-28 DIAGNOSIS — Q21.12 PFO (PATENT FORAMEN OVALE): ICD-10-CM

## 2023-12-28 DIAGNOSIS — I10 ESSENTIAL HYPERTENSION: Primary | ICD-10-CM

## 2023-12-28 DIAGNOSIS — R94.39 ABNORMAL STRESS TEST: ICD-10-CM

## 2023-12-28 DIAGNOSIS — R06.02 SHORTNESS OF BREATH: ICD-10-CM

## 2023-12-28 DIAGNOSIS — R00.1 SINUS BRADYCARDIA: ICD-10-CM

## 2023-12-28 DIAGNOSIS — R60.0 LOWER EXTREMITY EDEMA: ICD-10-CM

## 2023-12-28 PROCEDURE — 3008F BODY MASS INDEX DOCD: CPT | Performed by: INTERNAL MEDICINE

## 2023-12-28 PROCEDURE — 3077F SYST BP >= 140 MM HG: CPT | Performed by: INTERNAL MEDICINE

## 2023-12-28 PROCEDURE — 3079F DIAST BP 80-89 MM HG: CPT | Performed by: INTERNAL MEDICINE

## 2023-12-28 PROCEDURE — 99214 OFFICE O/P EST MOD 30 MIN: CPT | Performed by: INTERNAL MEDICINE

## 2023-12-28 PROCEDURE — 93000 ELECTROCARDIOGRAM COMPLETE: CPT | Performed by: INTERNAL MEDICINE

## 2023-12-28 RX ORDER — HYDROCHLOROTHIAZIDE 12.5 MG/1
12.5 TABLET ORAL
Qty: 39 TABLET | Refills: 3 | COMMUNITY
Start: 2023-12-28 | End: 2023-12-28 | Stop reason: SDUPTHER

## 2023-12-28 RX ORDER — LOSARTAN POTASSIUM 100 MG/1
100 TABLET ORAL DAILY
Qty: 90 TABLET | Refills: 3 | COMMUNITY
Start: 2023-12-28 | End: 2024-02-06 | Stop reason: SDUPTHER

## 2023-12-28 RX ORDER — HYDROCHLOROTHIAZIDE 12.5 MG/1
12.5 TABLET ORAL
Qty: 60 TABLET | Refills: 3 | Status: SHIPPED | OUTPATIENT
Start: 2023-12-28 | End: 2025-01-10

## 2023-12-28 NOTE — LETTER
December 28, 2023     Cris Quiroz MD  [address]  [facility] [address]    Patient: Joie Sullivan  YOB: 1950  Date of Visit: 12/28/2023      Dear Dr. Quiroz:    Thank you for referring Joie Sullivan to me for evaluation. Below are my notes for this consultation.    If you have questions, please do not hesitate to call me. I look forward to following your patient along with you.         Sincerely,        Amy Montero MD        CC: No Recipients    Amy Ken MD  12/28/2023  6:02 PM  Signed       Cardiology Office Visit     Patient ID: Joie Sullivan 73 y.o. female 1950  PCP: Cris Quiroz MD   History Present Illness     Joie Sullivan returns to the office today for follow-up of her hypertension and dyspnea on exertion.  Was difficult for us to get her blood pressure under control but it has been stable now for some time.  She was recently at another doctor's visit and was told that her legs were swollen-she did not think this was the case, but she was told to increase her hydrochlorothiazide to 5 times a week.  She did this for short time but then went back to 3 times a week dosing and wants my opinion about this.    She has had dyspnea on exertion for some time, although this is improved with better blood pressure control.  Her office moved on the campus and she is walking a longer distance which she finds difficult, but she does not really engage in much regular exercise outside of this.  We performed cardiac catheterization in April 2021 and she had no significant coronary disease.  There was a question of the PFO and her echocardiogram but a saturation run ruled out any evidence of shunt.  Her filling pressures were mildly elevated.    She is on amlodipine 10 mg daily and I think this is the most likely etiology for her leg swelling.  I think her legs look slightly swollen, but she does not think they are any different than baseline.  She is  "compliant with all of her medications.  She denies PND or orthopnea.  Her weight is up a bit and she admits that it is sometimes hard for her to watch her diet.    Allergies/Medications   Allergies:Patient has no known allergies.    Medications:   •  amLODIPine, Take 1 tablet (10 mg total) by mouth once daily.  •  cholecalciferol (vitamin D3), Take 1,000 Units by mouth daily.  •  hydrochlorothiazide, Take 1 tablet (12.5 mg total) by mouth 5 (five) times a week (Mon, Tue, Wed, Thu, Fri).  •  ketoconazole, Apply 1 application. topically daily. As needed with bilateral hand break out  •  losartan, Take 1 tablet (100 mg total) by mouth daily.  •  nebivoloL, Take 1 tablet (20 mg total) by mouth daily.      Physical Exam     Vitals:    12/28/23 1017   BP: 140/82   BP Location: Left upper arm   Patient Position: Sitting   Pulse: (!) 54   SpO2: 97%   Weight: 92.5 kg (204 lb)   Height: 1.689 m (5' 6.5\")     BP Readings from Last 3 Encounters:   12/28/23 140/82   07/14/23 (!) 155/62   06/16/23 128/78     Wt Readings from Last 3 Encounters:   12/28/23 92.5 kg (204 lb)   07/14/23 89.4 kg (197 lb)   06/16/23 89.4 kg (197 lb)      Physical Exam:  Constitutional: Overweight white female.  No apparent distress.   Eyes: No icterus  ENT:  Mucosa moist  Neck: Supple, no JVD or hepatojugular reflux.  No bruits.  Cardiac: Normal S1 and S2, regular rate and rhythm, no S3.  No rub.  No ectopy.  There is a 1/6 systolic murmur at the left sternal border.  Soft S4.  Lungs: Clear to auscultation bilaterally.  No wheezing.  Abdomen: Soft, nontender, positive normoactive bowel sounds.  No bruit.  Extremities: No clubbing or cyanosis.  No calf tenderness.  Trace edema.  Distal pulses are intact.  Skin: Warm and dry.  No jaundice.  Musculoskeletal: No joint swelling or erythema.  Mild swelling near her right ankle-chronic  Neurologic: Awake, alert, oriented.  Moving all extremities.  Psychiatric: No agitation.    Labs/Imaging/Procedures "   Labs  6/24/2022: CBC within normal limits.  Glucose 108, BUN 14, creatinine 0.7, potassium 4, LFTs unremarkable.  Total cholesterol 167, triglycerides 83, HDL 56 and LDL 95 mg/dL.  Hemoglobin A1c 6.2, TSH 1.47, vitamin D 27    Imaging  TTE 7/14/2023:  • Normal LV size with mild concentric LVH.  LVEF 70%.  Wall motion appears normal.   • Mild dilated RV with preserved function.  Mild TR.  Estimated RVSP 41 mmHg.    • Moderately dilated left atrium.  Inflow pattern is suggestive of elevated filling pressures.   • Evidence of PFO by color Doppler.    • Trileaflet sclerotic aortic valve.  No aortic stenosis.  Trace AI.  • Thickened atrial leaflets with mild mitral regurgitation.  • No significant pericardial effusion.  • Compared to the previous study, there is no significant change.       Cardiac catheterization 4/30/2021: No significant coronary artery disease.  No evidence of left to right shunt on right heart catheterization.  Cardiac index 3.5 L/min/m².  Mildly elevated filling pressures with RA 7, PA 49/14 with mean 29, PCWP 18 mmHg and a V wave up to 35 mmHg.      Pharmacologic MPI 4/5/2021: Negative pharmacologic EKG.  LVEF 70%.  Moderate-sized anterior defect with some reversibility suggestive of LAD ischemia.     Echo 4/5/2021: TDS.  Normal LV size.  Top normal wall thickness.  LVEF 65-70% with grossly normal wall motion.  Normal RV size and function.  Mild TR.  PASP 40-45 mmHg.  Moderately dilated left atrium.  Inflow suggestive of elevated filling pressures.  Probable PFO by color Doppler-no agitated saline was administered.  Trileaflet sclerotic aortic valve.  No aortic stenosis.  Trace AI.  Mild MR.  No significant pericardial effusion.  Compared to prior study October 2019, pulmonary pressures have increased.       EKG  Assessment/Plan     1. Essential hypertension    2. Sinus bradycardia    3. Abnormal stress test    4. Shortness of breath    5. PFO (patent foramen ovale)    6. Lower extremity edema        We reviewed her antihypertensive regimen and the edema-I told her I would increase the hydrochlorothiazide back to 5 times a week.  She was tolerating this without any issue.  Her edema is likely due to the high-dose amlodipine, but it was difficult to get her blood pressure under control and I am reluctant to reduce this.  There is also mechanical component from her weight and her legs being in the dependent position, so I encouraged her to use compression stockings.  She says she has found this difficult because of some skin reactions, but she is going to try again.  I also told her she should try elevating her legs under her desk and see if this helps.  She would benefit from increased exercise and weight loss.  She has no acute EKG changes, no evidence of volume overload and no new anginal symptoms.  We reviewed her recent echocardiogram and her LV function remains preserved.    Return in about 6 months (around 6/28/2024) for follow-up, earlier if any change in symptoms.  Available medical records reviewed and updated including laboratory data, imaging studies, previous outpatient and inpatient records.  Counseling/education performed. Care everywhere utilized where appropriate.    Thank you for allowing me to participate in the care of this patient.  I hope this information is helpful.      Amy Montero MD, [facility], [facility]  12/28/2023  6:01 PM     By signing my name below, I, Sybil Olsen, attest that this documentation has been prepared under the direction and in the presence of MD Sybil Chanel  12/28/2023 6:01 PM    This document was generated utilizing voice recognition technology. Please excuse any typographical errors which may be present.

## 2024-02-06 DIAGNOSIS — I10 ESSENTIAL HYPERTENSION: ICD-10-CM

## 2024-02-06 RX ORDER — LOSARTAN POTASSIUM 100 MG/1
100 TABLET ORAL DAILY
Qty: 90 TABLET | Refills: 3 | Status: SHIPPED
Start: 2024-02-06 | End: 2025-01-10

## 2024-02-06 NOTE — TELEPHONE ENCOUNTER
"Medicine Refill Request    Medicine Refill Insert    Name of Patient: Joie Sullivan    Caller's name/Relationship: self     Callback number: [phone]    Medication Name, Dosage, Supply: losartan (COZAAR) 100 mg tablet 90#    Quantity left: 3 more     Pharmacy: [facility]   Last Office Visit: Visit date not found   Last Consult Visit: Visit date not found  Last Telemedicine Visit: Visit date not found    Next Appointment: Visit date not found      Current Outpatient Medications:   •  amLODIPine (NORVASC) 10 mg tablet, Take 1 tablet (10 mg total) by mouth once daily., Disp: 90 tablet, Rfl: 3  •  cholecalciferol, vitamin D3, (VITAMIN D3) 25 mcg (1,000 unit) capsule, Take 1,000 Units by mouth daily., Disp: , Rfl:   •  hydrochlorothiazide 12.5 mg tablet, Take 1 tablet (12.5 mg total) by mouth 5 (five) times a week (Mon, Tue, Wed, Thu, Fri)., Disp: 60 tablet, Rfl: 3  •  ketoconazole (NIZORAL) 2 % cream, Apply 1 application. topically daily. As needed with bilateral hand break out, Disp: , Rfl:   •  losartan (COZAAR) 100 mg tablet, Take 1 tablet (100 mg total) by mouth daily., Disp: 90 tablet, Rfl: 3  •  nebivoloL (BYSTOLIC) 20 mg tablet, Take 1 tablet (20 mg total) by mouth daily., Disp: 90 tablet, Rfl: 3      BP Readings from Last 3 Encounters:   12/28/23 140/82   07/14/23 (!) 155/62   06/16/23 128/78       Recent Lab results:  No results found for: \"CHOL\", No results found for: \"HDL\", No results found for: \"LDLCALC\", No results found for: \"TRIG\"     Lab Results   Component Value Date    GLUCOSE 99 04/26/2021   , No results found for: \"HGBA1C\"      Lab Results   Component Value Date    CREATININE 0.6 04/26/2021       No results found for: \"TSH\"      No results found for: \"HGBA1C\"  "

## 2024-04-01 ENCOUNTER — APPOINTMENT (RX ONLY)
Dept: URBAN - METROPOLITAN AREA CLINIC 28 | Facility: CLINIC | Age: 74
Setting detail: DERMATOLOGY
End: 2024-04-01

## 2024-04-01 DIAGNOSIS — L259 CONTACT DERMATITIS AND OTHER ECZEMA, UNSPECIFIED CAUSE: ICD-10-CM | Status: INADEQUATELY CONTROLLED

## 2024-04-01 PROBLEM — L23.9 ALLERGIC CONTACT DERMATITIS, UNSPECIFIED CAUSE: Status: ACTIVE | Noted: 2024-04-01

## 2024-04-01 PROCEDURE — ? OTC TREATMENT REGIMEN

## 2024-04-01 PROCEDURE — 99213 OFFICE O/P EST LOW 20 MIN: CPT

## 2024-04-01 PROCEDURE — ? PRESCRIPTION

## 2024-04-01 PROCEDURE — ? COUNSELING

## 2024-04-01 PROCEDURE — ? PRESCRIPTION MEDICATION MANAGEMENT

## 2024-04-01 RX ORDER — HYDROCORTISONE 25 MG/G
1 CREAM TOPICAL
Qty: 30 | Refills: 3 | Status: ERX | COMMUNITY
Start: 2024-04-01

## 2024-04-01 RX ADMIN — HYDROCORTISONE 1: 25 CREAM TOPICAL at 00:00

## 2024-04-01 ASSESSMENT — LOCATION ZONE DERM
LOCATION ZONE: FACE
LOCATION ZONE: VULVA

## 2024-04-01 ASSESSMENT — LOCATION DETAILED DESCRIPTION DERM
LOCATION DETAILED: LEFT MEDIAL MALAR CHEEK
LOCATION DETAILED: MONS PUBIS

## 2024-04-01 ASSESSMENT — LOCATION SIMPLE DESCRIPTION DERM
LOCATION SIMPLE: GROIN
LOCATION SIMPLE: LEFT CHEEK

## 2024-04-01 NOTE — PROCEDURE: PRESCRIPTION MEDICATION MANAGEMENT
Detail Level: Zone
Render In Strict Bullet Format?: No
Initiate Treatment: hydrocortisone 2.5 % topical cream: Apply twice a day to affected areas on face and groin for up to 1 week, then take a break for 1 week. Repeat cycle as needed.

## 2024-04-01 NOTE — PROCEDURE: OTC TREATMENT REGIMEN
Detail Level: Detailed
Patient Specific Otc Recommendations (Will Not Stick From Patient To Patient): Vanicream\\nVaseline as moisturizer
Samples Given: Vanicream

## 2024-06-13 ENCOUNTER — APPOINTMENT (RX ONLY)
Dept: URBAN - METROPOLITAN AREA CLINIC 28 | Facility: CLINIC | Age: 74
Setting detail: DERMATOLOGY
End: 2024-06-13

## 2024-06-13 DIAGNOSIS — L23.9 ALLERGIC CONTACT DERMATITIS, UNSPECIFIED CAUSE: ICD-10-CM | Status: INADEQUATELY CONTROLLED

## 2024-06-13 DIAGNOSIS — D18.0 HEMANGIOMA: ICD-10-CM

## 2024-06-13 DIAGNOSIS — L82.1 OTHER SEBORRHEIC KERATOSIS: ICD-10-CM

## 2024-06-13 DIAGNOSIS — D22 MELANOCYTIC NEVI: ICD-10-CM

## 2024-06-13 DIAGNOSIS — L81.4 OTHER MELANIN HYPERPIGMENTATION: ICD-10-CM

## 2024-06-13 PROBLEM — D22.5 MELANOCYTIC NEVI OF TRUNK: Status: ACTIVE | Noted: 2024-06-13

## 2024-06-13 PROBLEM — D22.72 MELANOCYTIC NEVI OF LEFT LOWER LIMB, INCLUDING HIP: Status: ACTIVE | Noted: 2024-06-13

## 2024-06-13 PROBLEM — D22.61 MELANOCYTIC NEVI OF RIGHT UPPER LIMB, INCLUDING SHOULDER: Status: ACTIVE | Noted: 2024-06-13

## 2024-06-13 PROBLEM — D22.62 MELANOCYTIC NEVI OF LEFT UPPER LIMB, INCLUDING SHOULDER: Status: ACTIVE | Noted: 2024-06-13

## 2024-06-13 PROBLEM — D18.01 HEMANGIOMA OF SKIN AND SUBCUTANEOUS TISSUE: Status: ACTIVE | Noted: 2024-06-13

## 2024-06-13 PROBLEM — D22.71 MELANOCYTIC NEVI OF RIGHT LOWER LIMB, INCLUDING HIP: Status: ACTIVE | Noted: 2024-06-13

## 2024-06-13 PROCEDURE — ? FULL BODY SKIN EXAM

## 2024-06-13 PROCEDURE — ? PRESCRIPTION

## 2024-06-13 PROCEDURE — ? COUNSELING

## 2024-06-13 PROCEDURE — ? RECOMMENDATIONS

## 2024-06-13 PROCEDURE — 99213 OFFICE O/P EST LOW 20 MIN: CPT

## 2024-06-13 PROCEDURE — ? PRESCRIPTION MEDICATION MANAGEMENT

## 2024-06-13 PROCEDURE — ? TREATMENT REGIMEN

## 2024-06-13 RX ORDER — CLOBETASOL PROPIONATE 0.5 MG/G
OINTMENT TOPICAL BID
Qty: 60 | Refills: 2 | Status: ERX | COMMUNITY
Start: 2024-06-13

## 2024-06-13 RX ADMIN — CLOBETASOL PROPIONATE: 0.5 OINTMENT TOPICAL at 00:00

## 2024-06-13 ASSESSMENT — LOCATION ZONE DERM
LOCATION ZONE: TRUNK
LOCATION ZONE: FACE
LOCATION ZONE: EYELID
LOCATION ZONE: FINGER
LOCATION ZONE: ARM
LOCATION ZONE: LEG

## 2024-06-13 ASSESSMENT — LOCATION DETAILED DESCRIPTION DERM
LOCATION DETAILED: LEFT PROXIMAL DORSAL RING FINGER
LOCATION DETAILED: RIGHT ANTERIOR PROXIMAL UPPER ARM
LOCATION DETAILED: RIGHT LATERAL CANTHUS
LOCATION DETAILED: RIGHT LATERAL ABDOMEN
LOCATION DETAILED: RIGHT VENTRAL PROXIMAL FOREARM
LOCATION DETAILED: LEFT PROXIMAL POSTERIOR UPPER ARM
LOCATION DETAILED: RIGHT PROXIMAL DORSAL RING FINGER
LOCATION DETAILED: UPPER STERNUM
LOCATION DETAILED: RIGHT RIB CAGE
LOCATION DETAILED: RIGHT DISTAL POSTERIOR UPPER ARM
LOCATION DETAILED: RIGHT INFERIOR CENTRAL MALAR CHEEK
LOCATION DETAILED: RIGHT MEDIAL MANDIBULAR CHEEK
LOCATION DETAILED: LEFT SUPERIOR LATERAL LOWER BACK
LOCATION DETAILED: SUPERIOR THORACIC SPINE
LOCATION DETAILED: LEFT ANTERIOR PROXIMAL UPPER ARM
LOCATION DETAILED: RIGHT LATERAL SUPERIOR CHEST
LOCATION DETAILED: RIGHT ANTERIOR PROXIMAL THIGH
LOCATION DETAILED: EPIGASTRIC SKIN
LOCATION DETAILED: LEFT DISTAL POSTERIOR THIGH
LOCATION DETAILED: RIGHT PROXIMAL POSTERIOR UPPER ARM
LOCATION DETAILED: LEFT INFERIOR CENTRAL MALAR CHEEK
LOCATION DETAILED: LEFT ANTERIOR PROXIMAL THIGH
LOCATION DETAILED: LEFT MEDIAL MANDIBULAR CHEEK
LOCATION DETAILED: RIGHT INFERIOR MEDIAL MIDBACK

## 2024-06-13 ASSESSMENT — LOCATION SIMPLE DESCRIPTION DERM
LOCATION SIMPLE: LEFT CHEEK
LOCATION SIMPLE: LEFT POSTERIOR THIGH
LOCATION SIMPLE: RIGHT POSTERIOR UPPER ARM
LOCATION SIMPLE: CHEST
LOCATION SIMPLE: LEFT POSTERIOR UPPER ARM
LOCATION SIMPLE: LEFT RING FINGER
LOCATION SIMPLE: RIGHT EYELID
LOCATION SIMPLE: LEFT THIGH
LOCATION SIMPLE: LEFT LOWER BACK
LOCATION SIMPLE: RIGHT UPPER ARM
LOCATION SIMPLE: RIGHT FOREARM
LOCATION SIMPLE: ABDOMEN
LOCATION SIMPLE: RIGHT THIGH
LOCATION SIMPLE: UPPER BACK
LOCATION SIMPLE: RIGHT CHEEK
LOCATION SIMPLE: RIGHT RING FINGER
LOCATION SIMPLE: LEFT UPPER ARM
LOCATION SIMPLE: RIGHT LOWER BACK

## 2024-06-13 NOTE — PROCEDURE: PRESCRIPTION MEDICATION MANAGEMENT
[FreeTextEntry3] : All medical record entries made by the Scribe were at my, LINH Rosenthal , direction and personally dictated by me on 09/14/2022 . I have reviewed the chart and agree that the record accurately reflects my personal performance of the history, physical exam, assessment and plan. I have also personally directed, reviewed, and agreed with the chart.\par 
Initiate Treatment: Clobetasol 0.05 % topical ointment: Apply a thin layer twice a day to affected areas on hands for up to 2 weeks, then take a break for 1 week. Repeat cycle as needed.
Detail Level: Zone
Continue Regimen: Hydrocortisone 2.5% cream: Apply twice a day to affected areas on face for up to 1 week, then take a break for 1 week. Repeat cycle as needed.
Render In Strict Bullet Format?: No

## 2024-06-13 NOTE — HPI: EVALUATION OF SKIN LESION(S)
What Type Of Note Output Would You Prefer (Optional)?: Bullet Format
Hpi Title: Evaluation of Skin Lesions
Additional History: Spots of concern on left ring finger, brown spots on arms and chest.

## 2024-06-13 NOTE — PROCEDURE: RECOMMENDATIONS
Recommendation Preamble: The following recommendations were made during the visit:\\nD/c wearing new rings on fingers (likely nickel contact allergy)
Render Risk Assessment In Note?: no
Detail Level: Zone

## 2024-07-18 ENCOUNTER — OFFICE VISIT (OUTPATIENT)
Dept: CARDIOLOGY | Facility: CLINIC | Age: 74
End: 2024-07-18
Payer: COMMERCIAL

## 2024-07-18 VITALS
DIASTOLIC BLOOD PRESSURE: 76 MMHG | BODY MASS INDEX: 33.43 KG/M2 | OXYGEN SATURATION: 97 % | WEIGHT: 208 LBS | SYSTOLIC BLOOD PRESSURE: 124 MMHG | HEIGHT: 66 IN | HEART RATE: 55 BPM

## 2024-07-18 DIAGNOSIS — I10 ESSENTIAL HYPERTENSION: Primary | ICD-10-CM

## 2024-07-18 DIAGNOSIS — R06.02 SHORTNESS OF BREATH: ICD-10-CM

## 2024-07-18 DIAGNOSIS — R60.0 LOWER EXTREMITY EDEMA: ICD-10-CM

## 2024-07-18 DIAGNOSIS — R94.39 ABNORMAL STRESS TEST: ICD-10-CM

## 2024-07-18 DIAGNOSIS — Q21.12 PFO (PATENT FORAMEN OVALE): ICD-10-CM

## 2024-07-18 DIAGNOSIS — R00.1 SINUS BRADYCARDIA: ICD-10-CM

## 2024-07-18 PROCEDURE — 93000 ELECTROCARDIOGRAM COMPLETE: CPT | Performed by: INTERNAL MEDICINE

## 2024-07-18 PROCEDURE — 99214 OFFICE O/P EST MOD 30 MIN: CPT | Performed by: INTERNAL MEDICINE

## 2024-07-18 PROCEDURE — 3074F SYST BP LT 130 MM HG: CPT | Performed by: INTERNAL MEDICINE

## 2024-07-18 PROCEDURE — 3078F DIAST BP <80 MM HG: CPT | Performed by: INTERNAL MEDICINE

## 2024-07-18 PROCEDURE — 3008F BODY MASS INDEX DOCD: CPT | Performed by: INTERNAL MEDICINE

## 2024-07-18 RX ORDER — HYDROCORTISONE 25 MG/G
2.5 CREAM TOPICAL AS NEEDED
COMMUNITY

## 2024-07-18 RX ORDER — NEBIVOLOL 20 MG/1
20 TABLET ORAL DAILY
Qty: 90 TABLET | Refills: 3 | Status: SHIPPED | OUTPATIENT
Start: 2024-07-18

## 2024-07-18 RX ORDER — NEBIVOLOL 20 MG/1
20 TABLET ORAL DAILY
COMMUNITY
Start: 2024-07-18 | End: 2024-07-18 | Stop reason: SDUPTHER

## 2024-07-18 RX ORDER — CLOBETASOL PROPIONATE 0.5 MG/G
0.05 EMULSION TOPICAL 2 TIMES DAILY
COMMUNITY

## 2024-07-18 RX ORDER — AMLODIPINE BESYLATE 10 MG/1
10 TABLET ORAL
Qty: 90 TABLET | Refills: 3 | Status: SHIPPED | OUTPATIENT
Start: 2024-07-18

## 2024-07-18 NOTE — PROGRESS NOTES
Cardiology Office Visit     Patient ID: Joie Sullivan 73 y.o. female 1950  PCP: Cris Quiroz MD   History Present Illness     Joie Sullivan returns to the office today for follow-up of her hypertension.  Please recall that she is a 73-year-old white female with a history of hypertension, difficult to control but now on an appropriate medical regimen and chronic dyspnea on exertion.  She is trying to do more walking at work but admits that she does get dyspneic with exertion.  She has had echocardiography which shows mild LVH and preserved LV function.  Given her symptoms, cardiac catheterization was performed in April 2021 and she did not have any evidence of significant coronary disease.    She did have pulmonary hypertension on her right heart catheterization and she has been trying to lose weight.  During a recent GI procedure, she says the anesthesiologist told her that she has sleep apnea and she is now agreeable to get a sleep study.  I think this would be quite helpful for her since she also has a tendency to feel fatigued.  She has gained about 10 pounds over the last year but has had considerable family stress.  No exertional chest pain.    Allergies/Medications   Allergies:Patient has no known allergies.    Medications:    amLODIPine, Take 1 tablet (10 mg total) by mouth once daily.    cholecalciferol (vitamin D3), Take 1,000 Units by mouth daily.    clobetasoL-emollient, Apply 0.05 g topically 2 (two) times a day.    hydrochlorothiazide, Take 1 tablet (12.5 mg total) by mouth 5 (five) times a week (Mon, Tue, Wed, Thu, Fri).    hydrocortisone, Apply 2.5 g topically as needed for rash.    losartan, Take 1 tablet (100 mg total) by mouth daily.    nebivoloL, Take 1 tablet (20 mg total) by mouth daily.     Physical Exam     Vitals:    07/18/24 0834 07/18/24 0851   BP: 132/80 124/76   BP Location: Left upper arm Left upper arm   Patient Position: Sitting    Pulse: (!) 55    SpO2: 97%   "  Weight: 94.3 kg (208 lb)    Height: 1.676 m (5' 6\")      BP Readings from Last 3 Encounters:   07/18/24 124/76   12/28/23 140/82   07/14/23 (!) 155/62     Wt Readings from Last 3 Encounters:   07/18/24 94.3 kg (208 lb)   12/28/23 92.5 kg (204 lb)   07/14/23 89.4 kg (197 lb)      Physical Exam:  Constitutional: Overweight white female.  No apparent distress.   Eyes: No icterus  ENT:  Mucosa moist  Neck: Supple, no JVD or hepatojugular reflux.  No bruits.  Cardiac: Normal S1 and S2, regular rate and rhythm, no S3.  No rub.  No ectopy.  There is a 1/6 systolic murmur at the left sternal border.  Soft S4.  Lungs: Clear to auscultation bilaterally.  No wheezing.  Abdomen: Soft, nontender, positive normoactive bowel sounds.  No bruit.  Extremities: No clubbing or cyanosis.  No calf tenderness.  Trace edema.  Distal pulses are intact.  Skin: Warm and dry.  No jaundice.  Musculoskeletal: No joint swelling or erythema.  Mild swelling near her right ankle-chronic  Neurologic: Awake, alert, oriented.  Moving all extremities.  Psychiatric: No agitation.    Labs/Imaging/Procedures   Labs  06/18/2024: Glucose 108, BUN 12, creatinine 0.8, eGFR 78, sodium 138, potassium 4.3, chloride 99, CO2 26, calcium 9.7, LFTs unremarkable.    10/30/2023: Glucose 94, BUN 8, creatinine 0.73, eGFR 88, sodium 141, potassium 4.4, chloride 99, CO2 24, calcium 9.8, LFTs unremarkable.    Imaging  TTE 7/14/2023:  Normal LV size with mild concentric LVH.  LVEF 70%.  Wall motion appears normal.   Mild dilated RV with preserved function.  Mild TR.  Estimated RVSP 41 mmHg.    Moderately dilated left atrium.  Inflow pattern is suggestive of elevated filling pressures.   Evidence of PFO by color Doppler.    Trileaflet sclerotic aortic valve.  No aortic stenosis.  Trace AI.  Thickened atrial leaflets with mild mitral regurgitation.  No significant pericardial effusion.  Compared to the previous study, there is no significant change.       Cardiac " catheterization 4/30/2021: No significant coronary artery disease.  No evidence of left to right shunt on right heart catheterization.  Cardiac index 3.5 L/min/m².  Mildly elevated filling pressures with RA 7, PA 49/14 with mean 29, PCWP 18 mmHg and a V wave up to 35 mmHg.      Pharmacologic MPI 4/5/2021: Negative pharmacologic EKG.  LVEF 70%.  Moderate-sized anterior defect with some reversibility suggestive of LAD ischemia.     Echo 4/5/2021: TDS.  Normal LV size.  Top normal wall thickness.  LVEF 65-70% with grossly normal wall motion.  Normal RV size and function.  Mild TR.  PASP 40-45 mmHg.  Moderately dilated left atrium.  Inflow suggestive of elevated filling pressures.  Probable PFO by color Doppler-no agitated saline was administered.  Trileaflet sclerotic aortic valve.  No aortic stenosis.  Trace AI.  Mild MR.  No significant pericardial effusion.  Compared to prior study October 2019, pulmonary pressures have increased.    EKG: Sinus bradycardia, otherwise unremarkable.  No significant change.  Assessment/Plan     1. Essential hypertension    2. Sinus bradycardia    3. Abnormal stress test    4. Shortness of breath    5. PFO (patent foramen ovale)    6. Lower extremity edema       The patient is doing well on Bystolic 20 mg daily, losartan 100 mg daily, hydrochlorothiazide 12.5 mg 5 days a week and amlodipine 10 mg daily.  Her blood pressure was slightly high when she first arrived, but after sitting for few minutes, she remains normotensive.  No exertional chest pain and no evidence of congestive heart failure.  I renewed all of her medications and asked her to get an echocardiogram at her convenience to reassess her left ventricular function, diastolic inflow pattern, PFO and pulmonary pressures.  I told her that I strongly agree with getting a sleep study and I have been suspicious for some time that she has sleep apnea.  We discussed various options for diagnosis of sleep apnea as well as the  treatment.  I encouraged her to continue with regular low impact exercise.    Return in about 9 months (around 4/18/2025) for follow-up, earlier if any change in symptoms.  Available medical records reviewed and updated including laboratory data, imaging studies, previous outpatient and inpatient records.  Counseling/education performed. Care everywhere utilized where appropriate.    Thank you for allowing me to participate in the care of this patient.  I hope this information is helpful.      Amy Montero MD, [facility], [facility]  7/24/2024  3:13 PM  This document was generated utilizing voice recognition technology. Please excuse any typographical errors which may be present.      By signing my name below, I, Kalyn Jacob, attest that this documentation has been prepared under the direction and in the presence of MD Kalyn Chanel  7/24/2024 3:13 PM

## 2024-07-18 NOTE — LETTER
July 24, 2024     Cris Quiroz MD  [address] [place] [address]  [facility] [address]    Patient: Joie Sullivan  YOB: 1950  Date of Visit: 7/18/2024      Dear Dr. Quiroz:    Thank you for referring Joie Sullivan to me for evaluation. Below are my notes for this consultation.    If you have questions, please do not hesitate to call me. I look forward to following your patient along with you.         Sincerely,        Amy Montero MD        CC: No Recipients    Kalyn Jacob  7/18/2024  8:07 AM  Sign when Signing Visit       Cardiology Office Visit     Patient ID: Joie Sullivan 73 y.o. female 1950  PCP: Cris Quiroz MD   History Present Illness     Joie Sullivan returns to the office today for follow-up of her hypertension.  Please recall that she is a 73-year-old white female with a history of hypertension, difficult to control but now on an appropriate medical regimen and chronic dyspnea on exertion.  She is trying to do more walking at work but admits that she does get dyspneic with exertion.  She has had echocardiography which shows mild LVH and preserved LV function.  Given her symptoms, cardiac catheterization was performed in April 2021 and she did not have any evidence of significant coronary disease.    She did have pulmonary hypertension on her right heart catheterization and she has been trying to lose weight.  During a recent GI procedure, she says the anesthesiologist told her that she has sleep apnea and she is now agreeable to get a sleep study.  I think this would be quite helpful for her since she also has a tendency to feel fatigued.  She has gained about 10 pounds over the last year but has had considerable family stress.  No exertional chest pain.    Allergies/Medications   Allergies:Patient has no known allergies.    Medications:  •  amLODIPine, Take 1 tablet (10 mg total) by mouth once daily.  •  cholecalciferol (vitamin D3), Take 1,000 Units by mouth  "daily.  •  clobetasoL-emollient, Apply 0.05 g topically 2 (two) times a day.  •  hydrochlorothiazide, Take 1 tablet (12.5 mg total) by mouth 5 (five) times a week (Mon, Tue, Wed, Thu, Fri).  •  hydrocortisone, Apply 2.5 g topically as needed for rash.  •  losartan, Take 1 tablet (100 mg total) by mouth daily.  •  nebivoloL, Take 1 tablet (20 mg total) by mouth daily.     Physical Exam     Vitals:    07/18/24 0834 07/18/24 0851   BP: 132/80 124/76   BP Location: Left upper arm Left upper arm   Patient Position: Sitting    Pulse: (!) 55    SpO2: 97%    Weight: 94.3 kg (208 lb)    Height: 1.676 m (5' 6\")      BP Readings from Last 3 Encounters:   07/18/24 124/76   12/28/23 140/82   07/14/23 (!) 155/62     Wt Readings from Last 3 Encounters:   07/18/24 94.3 kg (208 lb)   12/28/23 92.5 kg (204 lb)   07/14/23 89.4 kg (197 lb)      Physical Exam:  Constitutional: Overweight white female.  No apparent distress.   Eyes: No icterus  ENT:  Mucosa moist  Neck: Supple, no JVD or hepatojugular reflux.  No bruits.  Cardiac: Normal S1 and S2, regular rate and rhythm, no S3.  No rub.  No ectopy.  There is a 1/6 systolic murmur at the left sternal border.  Soft S4.  Lungs: Clear to auscultation bilaterally.  No wheezing.  Abdomen: Soft, nontender, positive normoactive bowel sounds.  No bruit.  Extremities: No clubbing or cyanosis.  No calf tenderness.  Trace edema.  Distal pulses are intact.  Skin: Warm and dry.  No jaundice.  Musculoskeletal: No joint swelling or erythema.  Mild swelling near her right ankle-chronic  Neurologic: Awake, alert, oriented.  Moving all extremities.  Psychiatric: No agitation.    Labs/Imaging/Procedures   Labs  06/18/2024: Glucose 108, BUN 12, creatinine 0.8, eGFR 78, sodium 138, potassium 4.3, chloride 99, CO2 26, calcium 9.7, LFTs unremarkable.    10/30/2023: Glucose 94, BUN 8, creatinine 0.73, eGFR 88, sodium 141, potassium 4.4, chloride 99, CO2 24, calcium 9.8, LFTs unremarkable.    Imaging  TTE " 7/14/2023:  Normal LV size with mild concentric LVH.  LVEF 70%.  Wall motion appears normal.   Mild dilated RV with preserved function.  Mild TR.  Estimated RVSP 41 mmHg.    Moderately dilated left atrium.  Inflow pattern is suggestive of elevated filling pressures.   Evidence of PFO by color Doppler.    Trileaflet sclerotic aortic valve.  No aortic stenosis.  Trace AI.  Thickened atrial leaflets with mild mitral regurgitation.  No significant pericardial effusion.  Compared to the previous study, there is no significant change.       Cardiac catheterization 4/30/2021: No significant coronary artery disease.  No evidence of left to right shunt on right heart catheterization.  Cardiac index 3.5 L/min/m².  Mildly elevated filling pressures with RA 7, PA 49/14 with mean 29, PCWP 18 mmHg and a V wave up to 35 mmHg.      Pharmacologic MPI 4/5/2021: Negative pharmacologic EKG.  LVEF 70%.  Moderate-sized anterior defect with some reversibility suggestive of LAD ischemia.     Echo 4/5/2021: TDS.  Normal LV size.  Top normal wall thickness.  LVEF 65-70% with grossly normal wall motion.  Normal RV size and function.  Mild TR.  PASP 40-45 mmHg.  Moderately dilated left atrium.  Inflow suggestive of elevated filling pressures.  Probable PFO by color Doppler-no agitated saline was administered.  Trileaflet sclerotic aortic valve.  No aortic stenosis.  Trace AI.  Mild MR.  No significant pericardial effusion.  Compared to prior study October 2019, pulmonary pressures have increased.    EKG: Sinus bradycardia, otherwise unremarkable.  No significant change.  Assessment/Plan     1. Essential hypertension    2. Sinus bradycardia    3. Abnormal stress test    4. Shortness of breath    5. PFO (patent foramen ovale)    6. Lower extremity edema       The patient is doing well on Bystolic 20 mg daily, losartan 100 mg daily, hydrochlorothiazide 12.5 mg 5 days a week and amlodipine 10 mg daily.  Her blood pressure was slightly high when  she first arrived, but after sitting for few minutes, she remains normotensive.  No exertional chest pain and no evidence of congestive heart failure.  I renewed all of her medications and asked her to get an echocardiogram at her convenience to reassess her left ventricular function, diastolic inflow pattern, PFO and pulmonary pressures.  I told her that I strongly agree with getting a sleep study and I have been suspicious for some time that she has sleep apnea.  We discussed various options for diagnosis of sleep apnea as well as the treatment.  I encouraged her to continue with regular low impact exercise.    Return in about 9 months (around 4/18/2025) for follow-up, earlier if any change in symptoms.  Available medical records reviewed and updated including laboratory data, imaging studies, previous outpatient and inpatient records.  Counseling/education performed. Care everywhere utilized where appropriate.    Thank you for allowing me to participate in the care of this patient.  I hope this information is helpful.      Amy Montero MD, [facility], [facility]  7/24/2024  3:09 PM  This document was generated utilizing voice recognition technology. Please excuse any typographical errors which may be present.      By signing my name below, I, Kalyn Jacob, attest that this documentation has been prepared under the direction and in the presence of MD Kalyn Chanel  7/24/2024 3:09 PM

## 2024-07-18 NOTE — LETTER
July 24, 2024     Cris Quiroz MD  [address] [place] [address]  [facility] [address]    Patient: Joie Sullivan  YOB: 1950  Date of Visit: 7/18/2024      Dear Dr. Quiroz:    Thank you for referring Joie Sullivan to me for evaluation. Below are my notes for this consultation.    If you have questions, please do not hesitate to call me. I look forward to following your patient along with you.         Sincerely,        Amy Montero MD        CC: No Recipients    Amy Ken MD  7/24/2024  3:13 PM  Sign when Signing Visit       Cardiology Office Visit     Patient ID: Joie Sullivan 73 y.o. female 1950  PCP: Cris Quiroz MD   History Present Illness     Joie Sullivan returns to the office today for follow-up of her hypertension.  Please recall that she is a 73-year-old white female with a history of hypertension, difficult to control but now on an appropriate medical regimen and chronic dyspnea on exertion.  She is trying to do more walking at work but admits that she does get dyspneic with exertion.  She has had echocardiography which shows mild LVH and preserved LV function.  Given her symptoms, cardiac catheterization was performed in April 2021 and she did not have any evidence of significant coronary disease.    She did have pulmonary hypertension on her right heart catheterization and she has been trying to lose weight.  During a recent GI procedure, she says the anesthesiologist told her that she has sleep apnea and she is now agreeable to get a sleep study.  I think this would be quite helpful for her since she also has a tendency to feel fatigued.  She has gained about 10 pounds over the last year but has had considerable family stress.  No exertional chest pain.    Allergies/Medications   Allergies:Patient has no known allergies.    Medications:  •  amLODIPine, Take 1 tablet (10 mg total) by mouth once daily.  •  cholecalciferol (vitamin D3), Take 1,000 Units  "by mouth daily.  •  clobetasoL-emollient, Apply 0.05 g topically 2 (two) times a day.  •  hydrochlorothiazide, Take 1 tablet (12.5 mg total) by mouth 5 (five) times a week (Mon, Tue, Wed, Thu, Fri).  •  hydrocortisone, Apply 2.5 g topically as needed for rash.  •  losartan, Take 1 tablet (100 mg total) by mouth daily.  •  nebivoloL, Take 1 tablet (20 mg total) by mouth daily.     Physical Exam     Vitals:    07/18/24 0834 07/18/24 0851   BP: 132/80 124/76   BP Location: Left upper arm Left upper arm   Patient Position: Sitting    Pulse: (!) 55    SpO2: 97%    Weight: 94.3 kg (208 lb)    Height: 1.676 m (5' 6\")      BP Readings from Last 3 Encounters:   07/18/24 124/76   12/28/23 140/82   07/14/23 (!) 155/62     Wt Readings from Last 3 Encounters:   07/18/24 94.3 kg (208 lb)   12/28/23 92.5 kg (204 lb)   07/14/23 89.4 kg (197 lb)      Physical Exam:  Constitutional: Overweight white female.  No apparent distress.   Eyes: No icterus  ENT:  Mucosa moist  Neck: Supple, no JVD or hepatojugular reflux.  No bruits.  Cardiac: Normal S1 and S2, regular rate and rhythm, no S3.  No rub.  No ectopy.  There is a 1/6 systolic murmur at the left sternal border.  Soft S4.  Lungs: Clear to auscultation bilaterally.  No wheezing.  Abdomen: Soft, nontender, positive normoactive bowel sounds.  No bruit.  Extremities: No clubbing or cyanosis.  No calf tenderness.  Trace edema.  Distal pulses are intact.  Skin: Warm and dry.  No jaundice.  Musculoskeletal: No joint swelling or erythema.  Mild swelling near her right ankle-chronic  Neurologic: Awake, alert, oriented.  Moving all extremities.  Psychiatric: No agitation.    Labs/Imaging/Procedures   Labs  06/18/2024: Glucose 108, BUN 12, creatinine 0.8, eGFR 78, sodium 138, potassium 4.3, chloride 99, CO2 26, calcium 9.7, LFTs unremarkable.    10/30/2023: Glucose 94, BUN 8, creatinine 0.73, eGFR 88, sodium 141, potassium 4.4, chloride 99, CO2 24, calcium 9.8, LFTs " unremarkable.    Imaging  TTE 7/14/2023:  Normal LV size with mild concentric LVH.  LVEF 70%.  Wall motion appears normal.   Mild dilated RV with preserved function.  Mild TR.  Estimated RVSP 41 mmHg.    Moderately dilated left atrium.  Inflow pattern is suggestive of elevated filling pressures.   Evidence of PFO by color Doppler.    Trileaflet sclerotic aortic valve.  No aortic stenosis.  Trace AI.  Thickened atrial leaflets with mild mitral regurgitation.  No significant pericardial effusion.  Compared to the previous study, there is no significant change.       Cardiac catheterization 4/30/2021: No significant coronary artery disease.  No evidence of left to right shunt on right heart catheterization.  Cardiac index 3.5 L/min/m².  Mildly elevated filling pressures with RA 7, PA 49/14 with mean 29, PCWP 18 mmHg and a V wave up to 35 mmHg.      Pharmacologic MPI 4/5/2021: Negative pharmacologic EKG.  LVEF 70%.  Moderate-sized anterior defect with some reversibility suggestive of LAD ischemia.     Echo 4/5/2021: TDS.  Normal LV size.  Top normal wall thickness.  LVEF 65-70% with grossly normal wall motion.  Normal RV size and function.  Mild TR.  PASP 40-45 mmHg.  Moderately dilated left atrium.  Inflow suggestive of elevated filling pressures.  Probable PFO by color Doppler-no agitated saline was administered.  Trileaflet sclerotic aortic valve.  No aortic stenosis.  Trace AI.  Mild MR.  No significant pericardial effusion.  Compared to prior study October 2019, pulmonary pressures have increased.    EKG: Sinus bradycardia, otherwise unremarkable.  No significant change.  Assessment/Plan     1. Essential hypertension    2. Sinus bradycardia    3. Abnormal stress test    4. Shortness of breath    5. PFO (patent foramen ovale)    6. Lower extremity edema       The patient is doing well on Bystolic 20 mg daily, losartan 100 mg daily, hydrochlorothiazide 12.5 mg 5 days a week and amlodipine 10 mg daily.  Her blood  pressure was slightly high when she first arrived, but after sitting for few minutes, she remains normotensive.  No exertional chest pain and no evidence of congestive heart failure.  I renewed all of her medications and asked her to get an echocardiogram at her convenience to reassess her left ventricular function, diastolic inflow pattern, PFO and pulmonary pressures.  I told her that I strongly agree with getting a sleep study and I have been suspicious for some time that she has sleep apnea.  We discussed various options for diagnosis of sleep apnea as well as the treatment.  I encouraged her to continue with regular low impact exercise.    Return in about 9 months (around 4/18/2025) for follow-up, earlier if any change in symptoms.  Available medical records reviewed and updated including laboratory data, imaging studies, previous outpatient and inpatient records.  Counseling/education performed. Care everywhere utilized where appropriate.    Thank you for allowing me to participate in the care of this patient.  I hope this information is helpful.      Amy Montero MD, [facility], [facility]  7/24/2024  3:13 PM  This document was generated utilizing voice recognition technology. Please excuse any typographical errors which may be present.      By signing my name below, I, Kalyn Jacob, attest that this documentation has been prepared under the direction and in the presence of MD Kalyn Chanel  7/24/2024 3:13 PM

## 2024-07-24 LAB
ATRIAL RATE: 53
P AXIS: 37
PR INTERVAL: 154
QRS DURATION: 82
QT INTERVAL: 460
QTC CALCULATION(BAZETT): 431
R AXIS: 56
T WAVE AXIS: 61
VENTRICULAR RATE: 53

## 2025-01-06 ENCOUNTER — TELEPHONE (OUTPATIENT)
Dept: SCHEDULING | Facility: CLINIC | Age: 75
End: 2025-01-06
Payer: COMMERCIAL

## 2025-01-06 NOTE — TELEPHONE ENCOUNTER
Hospital Follow Up     Name of patient: Joie Sullivan    Caller Name: Joie Man    Relationship to patient: self    New or Established: est    Reason for visit: medication changes in ED/was on steroids/ high BP     Previous Cardiologist: current pt     PCP: Cris Quiroz MD      Insurance name: ibc/pc    Name of hospital pt was admitted: [facility]    Timeframe instructed to follow-up within: not specified     Best contact number: [phone]

## 2025-01-06 NOTE — TELEPHONE ENCOUNTER
Scheduled on 1/10/25- [facility] for patient to call back and confirm the appt date and time.

## 2025-01-10 ENCOUNTER — OFFICE VISIT (OUTPATIENT)
Dept: CARDIOLOGY | Facility: CLINIC | Age: 75
End: 2025-01-10
Payer: COMMERCIAL

## 2025-01-10 VITALS
DIASTOLIC BLOOD PRESSURE: 60 MMHG | BODY MASS INDEX: 30.86 KG/M2 | HEART RATE: 54 BPM | HEIGHT: 66 IN | SYSTOLIC BLOOD PRESSURE: 130 MMHG | OXYGEN SATURATION: 97 % | WEIGHT: 192 LBS

## 2025-01-10 DIAGNOSIS — G47.33 OSA ON CPAP: ICD-10-CM

## 2025-01-10 DIAGNOSIS — M31.6 TEMPORAL ARTERITIS (CMS/HCC): ICD-10-CM

## 2025-01-10 DIAGNOSIS — I10 ESSENTIAL HYPERTENSION: Primary | ICD-10-CM

## 2025-01-10 DIAGNOSIS — R00.1 SINUS BRADYCARDIA: ICD-10-CM

## 2025-01-10 DIAGNOSIS — R60.0 LOWER EXTREMITY EDEMA: ICD-10-CM

## 2025-01-10 DIAGNOSIS — R06.02 SHORTNESS OF BREATH: ICD-10-CM

## 2025-01-10 DIAGNOSIS — R94.39 ABNORMAL STRESS TEST: ICD-10-CM

## 2025-01-10 LAB
ATRIAL RATE: 50
P AXIS: 38
PR INTERVAL: 124
QRS DURATION: 86
QT INTERVAL: 420
QTC CALCULATION(BAZETT): 382
R AXIS: 33
T WAVE AXIS: 30
VENTRICULAR RATE: 50

## 2025-01-10 PROCEDURE — 3008F BODY MASS INDEX DOCD: CPT | Performed by: INTERNAL MEDICINE

## 2025-01-10 PROCEDURE — 99215 OFFICE O/P EST HI 40 MIN: CPT | Performed by: INTERNAL MEDICINE

## 2025-01-10 PROCEDURE — 93000 ELECTROCARDIOGRAM COMPLETE: CPT | Performed by: INTERNAL MEDICINE

## 2025-01-10 PROCEDURE — 3078F DIAST BP <80 MM HG: CPT | Performed by: INTERNAL MEDICINE

## 2025-01-10 PROCEDURE — 3075F SYST BP GE 130 - 139MM HG: CPT | Performed by: INTERNAL MEDICINE

## 2025-01-10 RX ORDER — METFORMIN HYDROCHLORIDE 500 MG/1
500 TABLET ORAL 2 TIMES DAILY WITH MEALS
COMMUNITY

## 2025-01-10 RX ORDER — PSYLLIUM HUSK 0.4 G
2 CAPSULE ORAL DAILY
COMMUNITY
Start: 2024-12-18 | End: 2025-06-16

## 2025-01-10 RX ORDER — GLIPIZIDE 5 MG/1
5 TABLET ORAL
COMMUNITY

## 2025-01-10 RX ORDER — VALSARTAN 160 MG/1
160 TABLET ORAL DAILY
COMMUNITY
Start: 2025-01-02 | End: 2025-12-28

## 2025-01-10 RX ORDER — PREDNISONE 50 MG/1
TABLET ORAL
COMMUNITY
Start: 2025-01-10

## 2025-01-10 RX ORDER — FAMOTIDINE 20 MG/1
20 TABLET, FILM COATED ORAL 2 TIMES DAILY
COMMUNITY
Start: 2024-12-03

## 2025-01-10 RX ORDER — SULFAMETHOXAZOLE AND TRIMETHOPRIM 800; 160 MG/1; MG/1
1 TABLET ORAL 3 TIMES WEEKLY
COMMUNITY
Start: 2024-12-18 | End: 2025-06-04

## 2025-01-10 NOTE — LETTER
"January 10, 2025     Cris Quiroz MD  [address]  [address]  [facility] [address]    Patient: Joie Sullivan  YOB: 1950  Date of Visit: 1/10/2025      Dear Dr. Quiroz:    Thank you for referring Joie Sullivan to me for evaluation. Below are my notes for this consultation.    If you have questions, please do not hesitate to call me. I look forward to following your patient along with you.         Sincerely,        Amy Montero MD        CC: MD Ekaterina Valerio Erin, MD  1/10/2025  3:40 PM  Sign when Signing Visit       Cardiology Office Visit     Patient ID: Joie Sullivan 74 y.o. female 1950  PCP: Cris Quiroz MD   History Present Illness     Joie Sullivan returns to the office today for a hospital follow-up.  I last saw her in July for management of her chronic difficult to control hypertension, chronic dyspnea on exertion and sleep apnea on CPAP.  She says she was doing well until November when she developed jaw pain, that she attributed to TMJ.  Over the next days to week she started developing other symptoms including headaches and some strange feelings in her right eye.  She was seen in urgent care and after getting the antibiotic the jaw pain improved.  She went to the eye doctor and thought that the eye irritation was related to her CPAP, but was told that if there were any additional changes that she should seek immediate medical attention at [facility].  She says the drops helped, but then she experienced acute visual loss in her right eye.    She went urgently to the [facility] and was admitted to the neurologic unit at [place].  She says she was told that there was a presumed diagnosis of giant cell arteritis, but they also wanted to \"rule out a stroke\".  She had a carotid ultrasound that did not show any internal carotid disease although she did have some plaque in the external carotids.  She was started on " "high-dose steroids, seen by rheumatology and had a temporal artery biopsy which later returned positive.  She started feeling better with the high-dose steroids but unfortunately her right visual deficit is persistent.  She is following with rheumatology and her most recent lab work showed that her sed rate is only 2.  The headaches, flulike symptoms and jaw pain have all resolved.    Allergies/Medications   Allergies:Patient has no known allergies.    Medications:  Current Outpatient Medications   Medication Instructions   • amLODIPine (NORVASC) 10 mg, oral, Daily (8a)   • calcium-vitamin D3 500 mg-5 mcg (200 unit) per tablet 2 tablets, Daily   • cholecalciferol (vitamin D3) (VITAMIN D3) 1,000 Units, Daily   • clobetasoL-emollient (TEMOVATE) 0.05 % cream 0.05 g, 2 times daily   • famotidine (PEPCID) 20 mg, 2 times daily   • glipiZIDE (GLUCOTROL) 5 mg   • hydrocortisone 2.5 g, As needed   • metFORMIN (GLUCOPHAGE) 500 mg, oral, 2 times daily with meals   • nebivoloL (BYSTOLIC) 20 mg, oral, Daily   • predniSONE (DELTASONE) 50 mg tablet As directed   • sulfamethoxazole-trimethoprim (BACTRIM DS) 800-160 mg per tablet 1 tablet, 3 times weekly   • tocilizumab 162 mg   • valsartan (DIOVAN) 160 mg, Daily      Physical Exam     Vitals:    01/10/25 1049   BP: 130/60   BP Location: Left upper arm   Patient Position: Sitting   Pulse: (!) 54   SpO2: 97%   Weight: 87.1 kg (192 lb)   Height: 1.676 m (5' 6\")     BP Readings from Last 3 Encounters:   01/10/25 130/60   07/18/24 124/76   12/28/23 140/82     Wt Readings from Last 3 Encounters:   01/10/25 87.1 kg (192 lb)   07/18/24 94.3 kg (208 lb)   12/28/23 92.5 kg (204 lb)      Physical Exam:  Constitutional: Overweight white female.  No apparent distress.   Eyes: No icterus  ENT:  Mucosa moist  Neck: Supple, no JVD or hepatojugular reflux.  No bruits.  Cardiac: Normal S1 and S2, regular rate and rhythm, no S3.  No rub.  No ectopy.    Lungs: Clear to auscultation bilaterally.  No " wheezing.  Abdomen: Soft, nontender, positive normoactive bowel sounds.  No bruit.  Extremities: No clubbing or cyanosis.  No calf tenderness.  No edema.  Distal pulses are intact.  Skin: Warm and dry.  No jaundice.  Musculoskeletal: No joint swelling or erythema.  Neurologic: Awake, alert, oriented.  Moving all extremities.  Psychiatric: No agitation.    Labs/Imaging/Procedures   Labs  WBC 13.4, hemoglobin 14, platelets 235,000, glucose 163, BUN 19, creatinine 0.99, potassium 3.8, LFTs unremarkable.  Total cholesterol 180, triglycerides 108, HDL 80 and LDL 81 mg/dL.    Imaging  CTA chest abdomen and pelvis 11/30/2024: Performed at [place].  Mild atherosclerotic calcifications.  Mild aortic root calcifications.  No evidence of aneurysm or arteritis.  Mild abdominal aortic calcifications.  Patent lower extremity arteries.  Coronary calcifications and enlarged left atrium.  Evidence of osteoporosis.    Carotid ultrasound 11/28/2024: No significant plaque right ICA.  Minimal plaque left ICA.  Calcified plaque bilateral external carotids with evidence of narrowing and elevated velocities.  Antegrade vertebral flow.    TTE 7/14/2023:  Normal LV size with mild concentric LVH.  LVEF 70%.  Wall motion appears normal.   Mild dilated RV with preserved function.  Mild TR.  Estimated RVSP 41 mmHg.    Moderately dilated left atrium.  Inflow pattern is suggestive of elevated filling pressures.   Evidence of PFO by color Doppler.    Trileaflet sclerotic aortic valve.  No aortic stenosis.  Trace AI.  Thickened atrial leaflets with mild mitral regurgitation.  No significant pericardial effusion.  Compared to the previous study, there is no significant change.       Cardiac catheterization 4/30/2021: No significant coronary artery disease.  No evidence of left to right shunt on right heart catheterization.  Cardiac index 3.5 L/min/m².  Mildly elevated filling pressures with RA 7, PA 49/14 with mean 29, PCWP 18 mmHg and a V wave up  to 35 mmHg.      Pharmacologic MPI 4/5/2021: Negative pharmacologic EKG.  LVEF 70%.  Moderate-sized anterior defect with some reversibility suggestive of LAD ischemia.     Echo 4/5/2021: TDS.  Normal LV size.  Top normal wall thickness.  LVEF 65-70% with grossly normal wall motion.  Normal RV size and function.  Mild TR.  PASP 40-45 mmHg.  Moderately dilated left atrium.  Inflow suggestive of elevated filling pressures.  Probable PFO by color Doppler-no agitated saline was administered.  Trileaflet sclerotic aortic valve.  No aortic stenosis.  Trace AI.  Mild MR.  No significant pericardial effusion.  Compared to prior study October 2019, pulmonary pressures have increased.    EKG  Assessment/Plan     1. Essential hypertension    2. Sinus bradycardia    3. Shortness of breath    4. Abnormal stress test    5. Lower extremity edema    6. Temporal arteritis (CMS/HCC)    7. ANTONIO on CPAP       We reviewed all of her available records and recent lab work.  Currently she is on a very slow prednisone taper and remains on 50 mg.  She is also on immunologic therapy and following up with rheumatology.  She is pleased that her last immunologic panel showed significant improvement.    She is no longer on potassium because she had a potassium value that was elevated.  I think this is fine since her hydrochlorothiazide was stopped when she was admitted to [place].  She is on valsartan 160 mg daily and her pressures well-controlled despite the fact that she is on high-dose prednisone.  She is on prophylactic Bactrim as well at this point but seems to be tolerating all these medications without a problem.    Her LDL is greater than 70, but with all of her current relatively acute issues, I am going to hold off on adding any additional medication.  She really has not been that enthusiastic about lipid therapy if it can be avoided and her LDL is in the 80s.    I did not make any changes in her current antihypertensive regimen.  Her  EKG shows no acute change.  She was told that she did not have any evidence of stroke.  Her sleep apnea is being treated with CPAP.  She would benefit from some weight loss, which would also improve her lipids and her blood pressure.    Return in about 3 months (around 4/10/2025) for follow-up, earlier if any change in symptoms.  We had a prolonged discussion about these complex clinical issues and went over the various important aspects to consider. All questions were answered.  I spent 47 minutes on the date of service performing the specified activities.  Available medical records reviewed and updated including laboratory data, imaging studies, previous outpatient and inpatient records.  Counseling/education performed. Care everywhere utilized where appropriate.    Thank you for allowing me to participate in the care of this patient.  I hope this information is helpful.      Amy Montero MD [facility], [facility]  1/10/2025  3:40 PM  This document was generated utilizing voice recognition technology. Please excuse any typographical errors which may be present.

## 2025-01-10 NOTE — PROGRESS NOTES
"     Cardiology Office Visit     Patient ID: Joie Sullivan 74 y.o. female 1950  PCP: Cris Quiroz MD   History Present Illness     Joie Sullivan returns to the office today for a hospital follow-up.  I last saw her in July for management of her chronic difficult to control hypertension, chronic dyspnea on exertion and sleep apnea on CPAP.  She says she was doing well until November when she developed jaw pain, that she attributed to TMJ.  Over the next days to week she started developing other symptoms including headaches and some strange feelings in her right eye.  She was seen in urgent care and after getting the antibiotic the jaw pain improved.  She went to the eye doctor and thought that the eye irritation was related to her CPAP, but was told that if there were any additional changes that she should seek immediate medical attention at [facility].  She says the drops helped, but then she experienced acute visual loss in her right eye.    She went urgently to the [facility] and was admitted to the neurologic unit at [place].  She says she was told that there was a presumed diagnosis of giant cell arteritis, but they also wanted to \"rule out a stroke\".  She had a carotid ultrasound that did not show any internal carotid disease although she did have some plaque in the external carotids.  She was started on high-dose steroids, seen by rheumatology and had a temporal artery biopsy which later returned positive.  She started feeling better with the high-dose steroids but unfortunately her right visual deficit is persistent.  She is following with rheumatology and her most recent lab work showed that her sed rate is only 2.  The headaches, flulike symptoms and jaw pain have all resolved.    Allergies/Medications   Allergies:Patient has no known allergies.    Medications:  Current Outpatient Medications   Medication Instructions    amLODIPine (NORVASC) 10 mg, oral, Daily (8a)    " "calcium-vitamin D3 500 mg-5 mcg (200 unit) per tablet 2 tablets, Daily    cholecalciferol (vitamin D3) (VITAMIN D3) 1,000 Units, Daily    clobetasoL-emollient (TEMOVATE) 0.05 % cream 0.05 g, 2 times daily    famotidine (PEPCID) 20 mg, 2 times daily    glipiZIDE (GLUCOTROL) 5 mg    hydrocortisone 2.5 g, As needed    metFORMIN (GLUCOPHAGE) 500 mg, oral, 2 times daily with meals    nebivoloL (BYSTOLIC) 20 mg, oral, Daily    predniSONE (DELTASONE) 50 mg tablet As directed    sulfamethoxazole-trimethoprim (BACTRIM DS) 800-160 mg per tablet 1 tablet, 3 times weekly    tocilizumab 162 mg    valsartan (DIOVAN) 160 mg, Daily      Physical Exam     Vitals:    01/10/25 1049   BP: 130/60   BP Location: Left upper arm   Patient Position: Sitting   Pulse: (!) 54   SpO2: 97%   Weight: 87.1 kg (192 lb)   Height: 1.676 m (5' 6\")     BP Readings from Last 3 Encounters:   01/10/25 130/60   07/18/24 124/76   12/28/23 140/82     Wt Readings from Last 3 Encounters:   01/10/25 87.1 kg (192 lb)   07/18/24 94.3 kg (208 lb)   12/28/23 92.5 kg (204 lb)      Physical Exam:  Constitutional: Overweight white female.  No apparent distress.   Eyes: No icterus  ENT:  Mucosa moist  Neck: Supple, no JVD or hepatojugular reflux.  No bruits.  Cardiac: Normal S1 and S2, regular rate and rhythm, no S3.  No rub.  No ectopy.    Lungs: Clear to auscultation bilaterally.  No wheezing.  Abdomen: Soft, nontender, positive normoactive bowel sounds.  No bruit.  Extremities: No clubbing or cyanosis.  No calf tenderness.  No edema.  Distal pulses are intact.  Skin: Warm and dry.  No jaundice.  Musculoskeletal: No joint swelling or erythema.  Neurologic: Awake, alert, oriented.  Moving all extremities.  Psychiatric: No agitation.    Labs/Imaging/Procedures   Labs  WBC 13.4, hemoglobin 14, platelets 235,000, glucose 163, BUN 19, creatinine 0.99, potassium 3.8, LFTs unremarkable.  Total cholesterol 180, triglycerides 108, HDL 80 and LDL 81 mg/dL.    Imaging  CTA " chest abdomen and pelvis 11/30/2024: Performed at [place].  Mild atherosclerotic calcifications.  Mild aortic root calcifications.  No evidence of aneurysm or arteritis.  Mild abdominal aortic calcifications.  Patent lower extremity arteries.  Coronary calcifications and enlarged left atrium.  Evidence of osteoporosis.    Carotid ultrasound 11/28/2024: No significant plaque right ICA.  Minimal plaque left ICA.  Calcified plaque bilateral external carotids with evidence of narrowing and elevated velocities.  Antegrade vertebral flow.    TTE 7/14/2023:  Normal LV size with mild concentric LVH.  LVEF 70%.  Wall motion appears normal.   Mild dilated RV with preserved function.  Mild TR.  Estimated RVSP 41 mmHg.    Moderately dilated left atrium.  Inflow pattern is suggestive of elevated filling pressures.   Evidence of PFO by color Doppler.    Trileaflet sclerotic aortic valve.  No aortic stenosis.  Trace AI.  Thickened atrial leaflets with mild mitral regurgitation.  No significant pericardial effusion.  Compared to the previous study, there is no significant change.       Cardiac catheterization 4/30/2021: No significant coronary artery disease.  No evidence of left to right shunt on right heart catheterization.  Cardiac index 3.5 L/min/m².  Mildly elevated filling pressures with RA 7, PA 49/14 with mean 29, PCWP 18 mmHg and a V wave up to 35 mmHg.      Pharmacologic MPI 4/5/2021: Negative pharmacologic EKG.  LVEF 70%.  Moderate-sized anterior defect with some reversibility suggestive of LAD ischemia.     Echo 4/5/2021: TDS.  Normal LV size.  Top normal wall thickness.  LVEF 65-70% with grossly normal wall motion.  Normal RV size and function.  Mild TR.  PASP 40-45 mmHg.  Moderately dilated left atrium.  Inflow suggestive of elevated filling pressures.  Probable PFO by color Doppler-no agitated saline was administered.  Trileaflet sclerotic aortic valve.  No aortic stenosis.  Trace AI.  Mild MR.  No significant  pericardial effusion.  Compared to prior study October 2019, pulmonary pressures have increased.    EKG  Assessment/Plan     1. Essential hypertension    2. Sinus bradycardia    3. Shortness of breath    4. Abnormal stress test    5. Lower extremity edema    6. Temporal arteritis (CMS/HCC)    7. ANTONIO on CPAP       We reviewed all of her available records and recent lab work.  Currently she is on a very slow prednisone taper and remains on 50 mg.  She is also on immunologic therapy and following up with rheumatology.  She is pleased that her last immunologic panel showed significant improvement.    She is no longer on potassium because she had a potassium value that was elevated.  I think this is fine since her hydrochlorothiazide was stopped when she was admitted to [place].  She is on valsartan 160 mg daily and her pressures well-controlled despite the fact that she is on high-dose prednisone.  She is on prophylactic Bactrim as well at this point but seems to be tolerating all these medications without a problem.    Her LDL is greater than 70, but with all of her current relatively acute issues, I am going to hold off on adding any additional medication.  She really has not been that enthusiastic about lipid therapy if it can be avoided and her LDL is in the 80s.    I did not make any changes in her current antihypertensive regimen.  Her EKG shows no acute change.  She was told that she did not have any evidence of stroke.  Her sleep apnea is being treated with CPAP.  She would benefit from some weight loss, which would also improve her lipids and her blood pressure.    Return in about 3 months (around 4/10/2025) for follow-up, earlier if any change in symptoms.  We had a prolonged discussion about these complex clinical issues and went over the various important aspects to consider. All questions were answered.  I spent 47 minutes on the date of service performing the specified activities.  Available medical  records reviewed and updated including laboratory data, imaging studies, previous outpatient and inpatient records.  Counseling/education performed. Care everywhere utilized where appropriate.    Thank you for allowing me to participate in the care of this patient.  I hope this information is helpful.      Amy Montero MD [facility], [facility]  1/10/2025  3:40 PM  This document was generated utilizing voice recognition technology. Please excuse any typographical errors which may be present.

## 2025-01-20 ENCOUNTER — TELEPHONE (OUTPATIENT)
Dept: CARDIOLOGY | Facility: CLINIC | Age: 75
End: 2025-01-20
Payer: COMMERCIAL

## 2025-01-20 NOTE — TELEPHONE ENCOUNTER
LVM -- to inform patient that our office need to reschedule his 4/18/2025 Echo and OV with Dr. Tobar. The provider will be out of the office.

## 2025-01-20 NOTE — TELEPHONE ENCOUNTER
Pt r/s to 4/29 with Dr Tobar since she is out of office on 4/18 in [place]. She has an echo scheduled also, and would like to do it the same day as her appt. Only time available was 12pm. Is there any way for her to do her echo right before her visit in [facility]?    Pt can be reached at [phone]

## 2025-01-20 NOTE — TELEPHONE ENCOUNTER
Was able to add Joie Man for her Echo same day 4- in [place] office. Confirmed appt with her. CALEB

## 2025-04-29 ENCOUNTER — OFFICE VISIT (OUTPATIENT)
Dept: CARDIOLOGY | Facility: CLINIC | Age: 75
End: 2025-04-29
Payer: COMMERCIAL

## 2025-04-29 ENCOUNTER — HOSPITAL ENCOUNTER (OUTPATIENT)
Dept: CARDIOLOGY | Facility: CLINIC | Age: 75
Discharge: HOME | End: 2025-04-29
Attending: INTERNAL MEDICINE
Payer: COMMERCIAL

## 2025-04-29 VITALS
BODY MASS INDEX: 32.47 KG/M2 | WEIGHT: 202 LBS | RESPIRATION RATE: 16 BRPM | SYSTOLIC BLOOD PRESSURE: 130 MMHG | DIASTOLIC BLOOD PRESSURE: 72 MMHG | HEIGHT: 66 IN | HEART RATE: 62 BPM

## 2025-04-29 VITALS
DIASTOLIC BLOOD PRESSURE: 60 MMHG | BODY MASS INDEX: 32.47 KG/M2 | HEIGHT: 66 IN | SYSTOLIC BLOOD PRESSURE: 140 MMHG | WEIGHT: 202 LBS

## 2025-04-29 DIAGNOSIS — R60.0 LOWER EXTREMITY EDEMA: ICD-10-CM

## 2025-04-29 DIAGNOSIS — I10 ESSENTIAL HYPERTENSION: Primary | ICD-10-CM

## 2025-04-29 DIAGNOSIS — M31.6 TEMPORAL ARTERITIS (CMS/HCC): ICD-10-CM

## 2025-04-29 DIAGNOSIS — G47.33 OSA ON CPAP: ICD-10-CM

## 2025-04-29 DIAGNOSIS — R94.39 ABNORMAL STRESS TEST: ICD-10-CM

## 2025-04-29 DIAGNOSIS — Q21.12 PFO (PATENT FORAMEN OVALE): ICD-10-CM

## 2025-04-29 DIAGNOSIS — R06.02 SHORTNESS OF BREATH: ICD-10-CM

## 2025-04-29 DIAGNOSIS — R00.1 SINUS BRADYCARDIA: ICD-10-CM

## 2025-04-29 PROCEDURE — 3008F BODY MASS INDEX DOCD: CPT | Performed by: INTERNAL MEDICINE

## 2025-04-29 PROCEDURE — 3075F SYST BP GE 130 - 139MM HG: CPT | Performed by: INTERNAL MEDICINE

## 2025-04-29 PROCEDURE — 3078F DIAST BP <80 MM HG: CPT | Performed by: INTERNAL MEDICINE

## 2025-04-29 PROCEDURE — 99215 OFFICE O/P EST HI 40 MIN: CPT | Performed by: INTERNAL MEDICINE

## 2025-04-29 PROCEDURE — 93306 TTE W/DOPPLER COMPLETE: CPT | Performed by: INTERNAL MEDICINE

## 2025-04-29 PROCEDURE — 93000 ELECTROCARDIOGRAM COMPLETE: CPT | Performed by: INTERNAL MEDICINE

## 2025-04-29 RX ORDER — PREDNISONE 5 MG/1
15 TABLET ORAL DAILY
COMMUNITY
Start: 2025-04-29

## 2025-04-29 RX ORDER — HYDROCHLOROTHIAZIDE 12.5 MG/1
12.5 TABLET ORAL DAILY
COMMUNITY
Start: 2025-04-29 | End: 2025-05-19 | Stop reason: SDUPTHER

## 2025-04-29 RX ORDER — VALSARTAN 160 MG/1
320 TABLET ORAL DAILY
COMMUNITY
Start: 2025-04-29 | End: 2025-05-29 | Stop reason: SDUPTHER

## 2025-04-30 LAB
AORTIC ROOT ANNULUS - M-MODE: 2.6 CM
AORTIC ROOT ANNULUS: 3 CM
ASCENDING AORTA: 3.6 CM
ATRIAL RATE: 62
AV PEAK GRADIENT: 9 MMHG
AV PEAK VELOCITY-S: 1.47 M/S
AV VALVE AREA: 2.88 CM2
BSA FOR ECHO PROCEDURE: 2.07 M2
DOP CALC LVOT STROKE VOLUME: 117.52 ML
E WAVE DECELERATION TIME: 169 MS
E/A RATIO: 2.1
E/E' RATIO: 24.9
E/LAT E' RATIO: 15.3
EDV (BP): 74.5 CM3
EF (A4C): 66.4 %
EF A2C: 68.9 %
EJECTION FRACTION: 68.2 %
EST RIGHT VENT SYSTOLIC PRESSURE BY TRICUSPID REGURGITATION JET: 35 MMHG
ESV (BP): 23.7 CM3
FRACTIONAL SHORTENING: 43.72 %
HEART RATE: 68 BPM
INTERVENTRICULAR SEPTUM: 1.2 CM
IVC-EXP: 1.4 CM
IVC-INS: 1 CM
LA ESV (BP): 62.3 CM3
LA ESV INDEX (A2C): 30.53 CM3/M2
LA ESV INDEX (BP): 30.1 CM3/M2
LA/AORTA RATIO: 1.67
LAAS-AP2: 18.5 CM2
LAAS-AP4: 21.3 CM2
LAD 2D - M-MODE: 5.1 CM
LAD 2D: 5 CM
LAL MED-LAT (A4C): 5.57 CM
LAV-S: 63.2 CM3
LEFT ATRIAL LENGTH SUPERIOR-INFERIOR (APICAL 2-CHAMBER VIEW): 5.11 CM
LEFT ATRIUM VOLUME INDEX: 53.33 CM3/M2
LEFT ATRIUM VOLUME: 110.4 CM3
LEFT INTERNAL DIMENSION IN SYSTOLE: 2.42 CM (ref 2.94–4.45)
LEFT VENTRICLE DIASTOLIC VOLUME INDEX: 39.23 CM3/M2
LEFT VENTRICLE DIASTOLIC VOLUME: 81.2 CM3
LEFT VENTRICLE SYSTOLIC VOLUME INDEX: 13.19 CM3/M2
LEFT VENTRICLE SYSTOLIC VOLUME: 27.3 CM3
LEFT VENTRICULAR INTERNAL DIMENSION IN DIASTOLE: 4.3 CM (ref 5–6.94)
LEFT VENTRICULAR POSTERIOR WALL IN END DIASTOLE: 1.05 CM (ref 0.65–1.2)
LV DIASTOLIC VOLUME: 63.4 CM3
LV ESV (APICAL 2 CHAMBER): 19.6 CM3
LVAD-AP2: 24 CM2
LVAD-AP4: 28.5 CM2
LVAS-AP2: 11.7 CM2
LVAS-AP4: 14.4 CM2
LVEDVI(A2C): 30.63 CM3/M2
LVEDVI(BP): 35.99 CM3/M2
LVESVI(A2C): 9.47 CM3/M2
LVESVI(BP): 11.45 CM3/M2
LVLD-AP2: 7.44 CM
LVLD-AP4: 8.07 CM
LVLS-AP2: 6.13 CM
LVLS-AP4: 6.5 CM
LVOT 2D: 2.3 CM
LVOT A: 4.15 CM2
LVOT MG: 3 MMHG
LVOT MV: 0.84 M/S
LVOT PEAK VELOCITY: 1.3 M/S
LVOT PG: 7 MMHG
LVOT STROKE VOLUME INDEX: 56.77 ML/M2
LVOT VTI: 28.3 CM
MLH CV ECHO AVA INDEX VELOCITY RATIO: 1.4
MV E'TISSUE VEL-LAT: 0.08 M/S
MV E'TISSUE VEL-MED: 0.05 M/S
MV PEAK A VEL: 0.58 M/S
MV PEAK E VEL: 1.22 M/S
P AXIS: 37
POSTERIOR WALL: 1.05 CM
PR INTERVAL: 138
PV PEAK GRADIENT: 7 MMHG
PV PV: 1.31 M/S
QRS DURATION: 72
QT INTERVAL: 404
QTC CALCULATION(BAZETT): 410
R AXIS: 49
RAP: 3 MMHG
RVOT VMAX: 0.79 M/S
RVOT VTI: 19.5 CM
SEPTAL TISSUE DOPPLER FREE WALL LATE DIA VELOCITY (APICAL 4 CHAMBER VIEW): 0.19 M/S
T WAVE AXIS: 59
TR MAX PG: 31.58 MMHG
TRICUSPID VALVE PEAK REGURGITATION VELOCITY: 2.81 M/S
VENTRICULAR RATE: 62
Z-SCORE OF LEFT VENTRICULAR DIMENSION IN END DIASTOLE: -3.15
Z-SCORE OF LEFT VENTRICULAR DIMENSION IN END SYSTOLE: -3.17
Z-SCORE OF LEFT VENTRICULAR POSTERIOR WALL IN END DIASTOLE: 0.92

## 2025-05-19 ENCOUNTER — TELEPHONE (OUTPATIENT)
Dept: SCHEDULING | Facility: CLINIC | Age: 75
End: 2025-05-19
Payer: COMMERCIAL

## 2025-05-19 ENCOUNTER — OFFICE VISIT (OUTPATIENT)
Dept: CARDIOLOGY | Facility: CLINIC | Age: 75
End: 2025-05-19
Payer: COMMERCIAL

## 2025-05-19 VITALS — DIASTOLIC BLOOD PRESSURE: 72 MMHG | SYSTOLIC BLOOD PRESSURE: 162 MMHG | HEART RATE: 52 BPM | RESPIRATION RATE: 16 BRPM

## 2025-05-19 DIAGNOSIS — I10 ESSENTIAL HYPERTENSION: Primary | ICD-10-CM

## 2025-05-19 PROCEDURE — 3078F DIAST BP <80 MM HG: CPT | Performed by: NURSE PRACTITIONER

## 2025-05-19 PROCEDURE — 3077F SYST BP >= 140 MM HG: CPT | Performed by: NURSE PRACTITIONER

## 2025-05-19 PROCEDURE — 99214 OFFICE O/P EST MOD 30 MIN: CPT | Performed by: NURSE PRACTITIONER

## 2025-05-19 RX ORDER — HYDROCHLOROTHIAZIDE 12.5 MG/1
12.5 TABLET ORAL DAILY
Qty: 90 TABLET | Refills: 1 | Status: SHIPPED | OUTPATIENT
Start: 2025-05-19

## 2025-05-29 RX ORDER — VALSARTAN 160 MG/1
320 TABLET ORAL DAILY
Qty: 180 TABLET | Refills: 1 | Status: SHIPPED | OUTPATIENT
Start: 2025-05-29 | End: 2025-05-30

## 2025-05-30 ENCOUNTER — OFFICE VISIT (OUTPATIENT)
Dept: CARDIOLOGY | Facility: CLINIC | Age: 75
End: 2025-05-30
Payer: COMMERCIAL

## 2025-05-30 VITALS
DIASTOLIC BLOOD PRESSURE: 74 MMHG | BODY MASS INDEX: 31.8 KG/M2 | RESPIRATION RATE: 16 BRPM | SYSTOLIC BLOOD PRESSURE: 154 MMHG | HEART RATE: 60 BPM | WEIGHT: 197 LBS

## 2025-05-30 DIAGNOSIS — I10 ESSENTIAL HYPERTENSION: Primary | ICD-10-CM

## 2025-05-30 PROCEDURE — G22XX PR COMPLEXITY INHERENT TO E&M -ADD ON NON-BILLABLE: HCPCS | Performed by: NURSE PRACTITIONER

## 2025-05-30 PROCEDURE — 3008F BODY MASS INDEX DOCD: CPT | Performed by: NURSE PRACTITIONER

## 2025-05-30 PROCEDURE — 3078F DIAST BP <80 MM HG: CPT | Performed by: NURSE PRACTITIONER

## 2025-05-30 PROCEDURE — 99214 OFFICE O/P EST MOD 30 MIN: CPT | Performed by: NURSE PRACTITIONER

## 2025-05-30 PROCEDURE — 3077F SYST BP >= 140 MM HG: CPT | Performed by: NURSE PRACTITIONER

## 2025-05-30 RX ORDER — VALSARTAN 160 MG/1
160 TABLET ORAL 2 TIMES DAILY
Qty: 180 TABLET | Refills: 1 | Status: SHIPPED | OUTPATIENT
Start: 2025-05-30

## 2025-05-30 RX ORDER — NIFEDIPINE 30 MG/1
30 TABLET, EXTENDED RELEASE ORAL DAILY
Qty: 90 TABLET | Refills: 3 | Status: SHIPPED | OUTPATIENT
Start: 2025-05-30 | End: 2026-05-30

## 2025-06-02 NOTE — ASSESSMENT & PLAN NOTE
Mild concentric LVH on 4/25 echo.    Her monitor previously registering 10-15 points higher on systolic and 10 points higher on diastolic readings.    Since nifedipine 30 mg daily added on 5/30/25, her blood pressures registering 120-140's/50-70's.  No lightheadedness.    Current blood pressure medication regimen include: Hydrochlorothiazide 25 mg daily, Nebivolol 20 mg daily, nifedipine 30 mg daily and valsartan 160 mg twice daily.    Her blood pressure well controlled at today's visit.  She will continue on current regimen and watch for signs of lightheadedness.  She will check a sitting/standing blood pressure and to contact our office.

## 2025-06-02 NOTE — PROGRESS NOTES
Cardiology Note          HPI   Joie Sullivan is a 74 y.o. female presents for blood pressure check.  She reports significantly elevated blood pressures lately registering 180s over 110s yesterday.    She has been consistent with taking valsartan 320 mg daily, hydrochlorothiazide 12.5 mg daily and Nebivolol 20 mg daily.  She said she has been weaning down on the prednisone and is now taking 12.5 mg daily.  Her rheumatologist recommended not to take her Actemra injection tomorrow due to elevated blood pressure.  She has noted since starting on this medication in December her blood pressures has been trending upwards.  This medication has a potential for increasing blood pressure.  At time of 5/30/2025 visit, nifedipine 30 mg daily added to her regimen.    Her blood pressure readings on home monitor registering 120-140/50-70.  She has had improvement since starting on the nifedipine.    She has been watching her salt intake and has been keeping well hydrated.    Her URI symptoms improving.        Past Medical History:   Diagnosis Date    Allergic rhinitis     Benign colonic polyp     Bradycardia     Breast cancer (CMS/HCC) 1991    Tamoxifen - no chemo or XRT.  Had small cell cluster    Diarrhea     Eczema     GCA (giant cell arteritis) (CMS/HCC)     Hematologic abnormality     Hyperlipidemia     Hypertension     Leukocytosis     Shortness of breath        Past Surgical History   Procedure Laterality Date    Appendectomy      Breast lumpectomy      Early stage breast carcinoma.    Cholecystectomy      RIGHT HEART CATH WITH CORONARY ANGIOGRAPHY N/A 4/30/2021    Performed by Robert Alejo [place] [facility] [place]       Social History     Tobacco Use    Smoking status: Never    Smokeless tobacco: Never   Substance Use Topics    Alcohol use: Never    Drug use: Defer       Family History   Problem Relation Name Age of Onset    Stroke Biological Mother  70        Severe HTN    Emphysema Biological Father      Heart  disease Biological Father           on wait list for CABG    Hypertension Biological Brother         Allergies  Patient has no known allergies.    Current Outpatient Medications   Medication Sig Dispense Refill    calcium-vitamin D3 500 mg-5 mcg (200 unit) per tablet Take 2 tablets by mouth daily.      famotidine (PEPCID) 20 mg tablet Take 20 mg by mouth 2 times daily.      glipiZIDE (GLUCOTROL) 5 mg tablet Take 5 mg by mouth 2 (two) times a day.      hydrochlorothiazide 12.5 mg tablet Take 1 tablet (12.5 mg total) by mouth daily. (Patient taking differently: Take 25 mg by mouth daily.) 90 tablet 1    metFORMIN (GLUCOPHAGE) 500 mg tablet Take 500 mg by mouth 2 (two) times a day with meals.      nebivoloL (BYSTOLIC) 20 mg tablet Take 1 tablet (20 mg total) by mouth daily. 90 tablet 3    NIFEdipine XL (PROCARDIA XL) 30 mg 24 hr tablet Take 1 tablet (30 mg total) by mouth daily. 90 tablet 3    predniSONE (DELTASONE) 5 mg tablet Take 3 tablets (15 mg total) by mouth daily. (Patient taking differently: Take 12.5 mg by mouth daily.)      tocilizumab (ACTEMRA SUBQ) Inject under the skin every (seven) 7 days. Unsure of mgs, injects weekly      valsartan (DIOVAN) 160 mg tablet Take 1 tablet (160 mg total) by mouth 2 (two) times a day. 180 tablet 1     No current facility-administered medications for this visit.             Objective     Vitals:    25 1040   BP: (!) 126/56   Pulse: (!) 56   Resp: 16   B/p left arm sittin/58      Wt Readings from Last 3 Encounters:   25 89.4 kg (197 lb)   25 91.6 kg (202 lb)   25 91.6 kg (202 lb)        Lab Results   Component Value Date    WBC 9.33 2021    HGB 13.1 2021     2021     2021    K 4.1 2021    CREATININE 0.6 2021    INR 1.0 2021    HGBA1C 6.9 (H) 2025        I          Problem List Items Addressed This Visit       Essential hypertension - Primary    Mild concentric LVH on   echo.    Her monitor previously registering 10-15 points higher on systolic and 10 points higher on diastolic readings.    Since nifedipine 30 mg daily added on 5/30/25, her blood pressures registering 120-140's/50-70's.  No lightheadedness.    Current blood pressure medication regimen include: Hydrochlorothiazide 25 mg daily, Nebivolol 20 mg daily, nifedipine 30 mg daily and valsartan 160 mg twice daily.    Her blood pressure well controlled at today's visit.  She will continue on current regimen and watch for signs of lightheadedness.  She will check a sitting/standing blood pressure and to contact our office.               Relevant Orders    Basic metabolic panel          It was a pleasure seeing this patient in the office today.  She has follow-up with Dr. Tobar in August.    BECCA Alcantar  6/6/2025

## 2025-06-04 ENCOUNTER — TELEPHONE (OUTPATIENT)
Dept: SCHEDULING | Facility: CLINIC | Age: 75
End: 2025-06-04
Payer: COMMERCIAL

## 2025-06-04 NOTE — TELEPHONE ENCOUNTER
Pt inquiring if there are any times later than 8AM for BP check on Friday. Please confirm location with pt    Pt can be reached today at [phone]

## 2025-06-06 ENCOUNTER — OFFICE VISIT (OUTPATIENT)
Dept: CARDIOLOGY | Facility: CLINIC | Age: 75
End: 2025-06-06
Payer: COMMERCIAL

## 2025-06-06 VITALS — HEART RATE: 56 BPM | RESPIRATION RATE: 16 BRPM | DIASTOLIC BLOOD PRESSURE: 56 MMHG | SYSTOLIC BLOOD PRESSURE: 126 MMHG

## 2025-06-06 DIAGNOSIS — I10 ESSENTIAL HYPERTENSION: Primary | ICD-10-CM

## 2025-06-06 PROCEDURE — 3078F DIAST BP <80 MM HG: CPT | Performed by: NURSE PRACTITIONER

## 2025-06-06 PROCEDURE — 99213 OFFICE O/P EST LOW 20 MIN: CPT | Performed by: NURSE PRACTITIONER

## 2025-06-06 PROCEDURE — 3074F SYST BP LT 130 MM HG: CPT | Performed by: NURSE PRACTITIONER

## 2025-06-16 DIAGNOSIS — I10 ESSENTIAL HYPERTENSION: ICD-10-CM

## 2025-06-16 RX ORDER — NEBIVOLOL 20 MG/1
20 TABLET ORAL DAILY
Qty: 90 TABLET | Refills: 3 | Status: SHIPPED | OUTPATIENT
Start: 2025-06-16

## 2025-06-24 ENCOUNTER — RESULTS FOLLOW-UP (OUTPATIENT)
Dept: CARDIOLOGY | Facility: CLINIC | Age: 75
End: 2025-06-24
Payer: COMMERCIAL

## 2025-06-24 DIAGNOSIS — I10 PRIMARY HYPERTENSION: Primary | ICD-10-CM

## 2025-06-24 LAB
BUN SERPL-MCNC: 21 MG/DL (ref 8–27)
BUN/CREAT SERPL: 18 (ref 12–28)
CALCIUM SERPL-MCNC: 9.8 MG/DL (ref 8.7–10.3)
CHLORIDE SERPL-SCNC: 99 MMOL/L (ref 96–106)
CO2 SERPL-SCNC: 26 MMOL/L (ref 20–29)
CREAT SERPL-MCNC: 1.17 MG/DL (ref 0.57–1)
EGFRCR SERPLBLD CKD-EPI 2021: 49 ML/MIN/1.73
GLUCOSE SERPL-MCNC: 82 MG/DL (ref 70–99)
POTASSIUM SERPL-SCNC: 3.7 MMOL/L (ref 3.5–5.2)
SODIUM SERPL-SCNC: 140 MMOL/L (ref 134–144)

## 2025-06-24 RX ORDER — HYDROCHLOROTHIAZIDE 12.5 MG/1
12.5 TABLET ORAL DAILY
Start: 2025-06-24

## 2025-06-24 NOTE — TELEPHONE ENCOUNTER
BUN/creatinine level of 21/1.17 and potassium 3.7 on recent labs.  Mildly increased creat from 12/0.6 on 6/23/25.  She had noted lightheadedness with taking HCTZ 25 mg daily and decreased to 12.5 mg over 1 week ago with resolution of symptoms.  She admits she has not been checking her blood pressures as of late and will start doing so.  I told her we would keep the medications the same for now-changed her hydrochlorothiazide from 25 to 12.5 mg daily.  Will recheck a BMP in 1 month.

## 2025-07-24 LAB
BUN SERPL-MCNC: 22 MG/DL (ref 8–27)
BUN/CREAT SERPL: 22 (ref 12–28)
CALCIUM SERPL-MCNC: 10 MG/DL (ref 8.7–10.3)
CHLORIDE SERPL-SCNC: 96 MMOL/L (ref 96–106)
CO2 SERPL-SCNC: 23 MMOL/L (ref 20–29)
CREAT SERPL-MCNC: 0.99 MG/DL (ref 0.57–1)
EGFRCR SERPLBLD CKD-EPI 2021: 60 ML/MIN/1.73
GLUCOSE SERPL-MCNC: 83 MG/DL (ref 70–99)
POTASSIUM SERPL-SCNC: 4 MMOL/L (ref 3.5–5.2)
SODIUM SERPL-SCNC: 136 MMOL/L (ref 134–144)

## 2025-08-21 ENCOUNTER — OFFICE VISIT (OUTPATIENT)
Dept: CARDIOLOGY | Facility: CLINIC | Age: 75
End: 2025-08-21
Payer: COMMERCIAL

## 2025-08-21 VITALS
OXYGEN SATURATION: 98 % | HEIGHT: 66 IN | DIASTOLIC BLOOD PRESSURE: 76 MMHG | SYSTOLIC BLOOD PRESSURE: 138 MMHG | WEIGHT: 195 LBS | BODY MASS INDEX: 31.34 KG/M2 | HEART RATE: 54 BPM

## 2025-08-21 DIAGNOSIS — I10 ESSENTIAL HYPERTENSION: ICD-10-CM

## 2025-08-21 DIAGNOSIS — R00.1 SINUS BRADYCARDIA: ICD-10-CM

## 2025-08-21 DIAGNOSIS — I10 PRIMARY HYPERTENSION: Primary | ICD-10-CM

## 2025-08-21 DIAGNOSIS — I70.0 AORTIC CALCIFICATION (CMS/HCC): ICD-10-CM

## 2025-08-21 DIAGNOSIS — R60.0 LOWER EXTREMITY EDEMA: ICD-10-CM

## 2025-08-21 DIAGNOSIS — R06.02 SHORTNESS OF BREATH: ICD-10-CM

## 2025-08-21 DIAGNOSIS — R94.39 ABNORMAL STRESS TEST: ICD-10-CM

## 2025-08-21 DIAGNOSIS — M31.6 TEMPORAL ARTERITIS (CMS/HCC): ICD-10-CM

## 2025-08-21 DIAGNOSIS — G47.33 OSA ON CPAP: ICD-10-CM

## 2025-08-21 LAB
ATRIAL RATE: 54
P AXIS: 43
PR INTERVAL: 136
QRS DURATION: 86
QT INTERVAL: 432
QTC CALCULATION(BAZETT): 409
R AXIS: 60
T WAVE AXIS: 64
VENTRICULAR RATE: 54

## 2025-08-21 PROCEDURE — 3078F DIAST BP <80 MM HG: CPT | Performed by: INTERNAL MEDICINE

## 2025-08-21 PROCEDURE — 99215 OFFICE O/P EST HI 40 MIN: CPT | Performed by: INTERNAL MEDICINE

## 2025-08-21 PROCEDURE — 3008F BODY MASS INDEX DOCD: CPT | Performed by: INTERNAL MEDICINE

## 2025-08-21 PROCEDURE — 3075F SYST BP GE 130 - 139MM HG: CPT | Performed by: INTERNAL MEDICINE

## 2025-08-21 PROCEDURE — 93000 ELECTROCARDIOGRAM COMPLETE: CPT | Performed by: INTERNAL MEDICINE

## 2025-08-21 RX ORDER — NEBIVOLOL 20 MG/1
20 TABLET ORAL DAILY
COMMUNITY
Start: 2025-08-21 | End: 2025-08-21 | Stop reason: SDUPTHER

## 2025-08-21 RX ORDER — NEBIVOLOL 20 MG/1
20 TABLET ORAL DAILY
COMMUNITY
Start: 2025-08-21

## 2025-08-21 RX ORDER — PREDNISONE 5 MG/1
5 TABLET ORAL DAILY
COMMUNITY
Start: 2025-08-21

## (undated) DEVICE — SET SINGLE PATIENT STERILE DISP 65"

## (undated) DEVICE — CATH SWANGANZ 6FR 2LUMEN

## (undated) DEVICE — ***USE 143601*** PROBE COVER 48 IN STERILE WITH GEL

## (undated) DEVICE — GLIDEWIRE ANGLED .25IN X 150 J WIRE

## (undated) DEVICE — CATHETER DIAG FL 3.5  5FR

## (undated) DEVICE — CATHETER DIAG FR4 5FR

## (undated) DEVICE — GUIDEWIRE DIAGNOSTIC J .035. 150 (ORDER IN 5'S)

## (undated) DEVICE — GUIDEWIRE DIAGNOSTIC 035-260 EXCHANGE

## (undated) DEVICE — NEEDLE PERCUTANEOUS ENTRY

## (undated) DEVICE — CATH INTROCAN SAFETY FEP 20G X 1IN

## (undated) DEVICE — GLIDESHEATH RADIAL 6FR 10CM

## (undated) DEVICE — CONTROLLER HAND AVANTA

## (undated) DEVICE — KIT CATH LAB ANGIO